# Patient Record
Sex: FEMALE | Race: WHITE
[De-identification: names, ages, dates, MRNs, and addresses within clinical notes are randomized per-mention and may not be internally consistent; named-entity substitution may affect disease eponyms.]

---

## 2017-11-08 ENCOUNTER — CHARTING TRANS (OUTPATIENT)
Dept: OTHER | Age: 82
End: 2017-11-08

## 2018-11-02 VITALS
WEIGHT: 115.74 LBS | RESPIRATION RATE: 16 BRPM | BODY MASS INDEX: 20.51 KG/M2 | OXYGEN SATURATION: 64 % | HEIGHT: 63 IN | HEART RATE: 95 BPM | TEMPERATURE: 97.7 F

## 2019-01-09 ENCOUNTER — WALK IN (OUTPATIENT)
Dept: URGENT CARE | Age: 84
End: 2019-01-09

## 2019-01-09 VITALS — WEIGHT: 110.23 LBS | BODY MASS INDEX: 19.53 KG/M2

## 2019-01-09 DIAGNOSIS — H61.22 IMPACTED CERUMEN OF LEFT EAR: Primary | ICD-10-CM

## 2019-01-09 PROCEDURE — 99213 OFFICE O/P EST LOW 20 MIN: CPT | Performed by: NURSE PRACTITIONER

## 2019-07-03 ENCOUNTER — DIAGNOSTIC TRANS (OUTPATIENT)
Dept: OTHER | Age: 84
End: 2019-07-03

## 2019-07-03 ENCOUNTER — HOSPITAL (OUTPATIENT)
Dept: OTHER | Age: 84
End: 2019-07-03

## 2019-07-03 LAB
ALBUMIN SERPL-MCNC: 3.1 G/DL (ref 3.6–5.1)
ALP SERPL-CCNC: 67 UNITS/L (ref 45–117)
ALT SERPL-CCNC: 20 UNITS/L
ANALYZER ANC (IANC): ABNORMAL
ANION GAP SERPL CALC-SCNC: 10 MMOL/L (ref 10–20)
AST SERPL-CCNC: 21 UNITS/L
BASOPHILS # BLD: 0 K/MCL (ref 0–0.3)
BASOPHILS NFR BLD: 0 %
BILIRUB CONJ SERPL-MCNC: 0.2 MG/DL (ref 0–0.2)
BILIRUB SERPL-MCNC: 0.5 MG/DL (ref 0.2–1)
BUN SERPL-MCNC: 25 MG/DL (ref 6–20)
BUN/CREAT SERPL: 23 (ref 7–25)
CALCIUM SERPL-MCNC: 8.9 MG/DL (ref 8.4–10.2)
CHLORIDE SERPL-SCNC: 105 MMOL/L (ref 98–107)
CO2 SERPL-SCNC: 28 MMOL/L (ref 21–32)
CREAT SERPL-MCNC: 1.11 MG/DL (ref 0.51–0.95)
DIFFERENTIAL METHOD BLD: ABNORMAL
EOSINOPHIL # BLD: 0 K/MCL (ref 0.1–0.5)
EOSINOPHIL NFR BLD: 1 %
ERYTHROCYTE [DISTWIDTH] IN BLOOD: 13.4 % (ref 11–15)
GLUCOSE SERPL-MCNC: 129 MG/DL (ref 65–99)
HCT VFR BLD CALC: 34.6 % (ref 36–46.5)
HGB BLD-MCNC: 11.3 G/DL (ref 12–15.5)
IMM GRANULOCYTES # BLD AUTO: 0 K/MCL (ref 0–0.2)
IMM GRANULOCYTES NFR BLD: 0 %
INR PPP: 2.1
INR PPP: ABNORMAL
LYMPHOCYTES # BLD: 0.5 K/MCL (ref 1–4)
LYMPHOCYTES NFR BLD: 17 %
MAGNESIUM SERPL-MCNC: 2.3 MG/DL (ref 1.7–2.4)
MCH RBC QN AUTO: 34.2 PG (ref 26–34)
MCHC RBC AUTO-ENTMCNC: 32.7 G/DL (ref 32–36.5)
MCV RBC AUTO: 104.8 FL (ref 78–100)
MONOCYTES # BLD: 0.5 K/MCL (ref 0.3–0.9)
MONOCYTES NFR BLD: 18 %
NEUTROPHILS # BLD: 1.8 K/MCL (ref 1.8–7.7)
NEUTROPHILS NFR BLD: 64 %
NEUTS SEG NFR BLD: ABNORMAL %
NRBC (NRBCRE): 0 /100 WBC
PLATELET # BLD: 191 K/MCL (ref 140–450)
POTASSIUM SERPL-SCNC: 4 MMOL/L (ref 3.4–5.1)
PROT SERPL-MCNC: 6.7 G/DL (ref 6.4–8.2)
PROTHROMBIN TIME (PRT2): ABNORMAL
PROTHROMBIN TIME: 20.3 SEC (ref 9.7–11.8)
RBC # BLD: 3.3 MIL/MCL (ref 4–5.2)
SODIUM SERPL-SCNC: 139 MMOL/L (ref 135–145)
TROPONIN I SERPL HS-MCNC: <0.02 NG/ML
WBC # BLD: 2.8 K/MCL (ref 4.2–11)

## 2019-07-04 LAB
AMORPH SED URNS QL MICRO: ABNORMAL
APPEARANCE UR: ABNORMAL
BACTERIA #/AREA URNS HPF: ABNORMAL /HPF
BILIRUB UR QL: NEGATIVE
CAOX CRY URNS QL MICRO: ABNORMAL
COLOR UR: YELLOW
EPITH CASTS #/AREA URNS LPF: ABNORMAL /[LPF]
FATTY CASTS #/AREA URNS LPF: ABNORMAL /[LPF]
GLUCOSE UR-MCNC: NEGATIVE MG/DL
GRAN CASTS #/AREA URNS LPF: ABNORMAL /[LPF]
HGB UR QL: ABNORMAL
HYALINE CASTS #/AREA URNS LPF: ABNORMAL /LPF (ref 0–5)
KETONES UR-MCNC: NEGATIVE MG/DL
LEUKOCYTE ESTERASE UR QL STRIP: ABNORMAL
LEUKOCYTE ESTERASE UR QL STRIP: ABNORMAL
MIXED CELL CASTS #/AREA URNS LPF: ABNORMAL /[LPF]
MUCOUS THREADS URNS QL MICRO: PRESENT
NITRITE UR QL: NEGATIVE
PH UR: 6 UNITS (ref 5–7)
PROT UR QL: NEGATIVE MG/DL
RBC #/AREA URNS HPF: ABNORMAL /HPF (ref 0–2)
RBC CASTS #/AREA URNS LPF: ABNORMAL /[LPF]
RENAL EPI CELLS #/AREA URNS HPF: ABNORMAL /[HPF]
SP GR UR: 1.01 (ref 1–1.03)
SPECIMEN SOURCE: ABNORMAL
SPERM URNS QL MICRO: ABNORMAL
SQUAMOUS #/AREA URNS HPF: ABNORMAL /HPF (ref 0–5)
T VAGINALIS URNS QL MICRO: ABNORMAL
TRANS CELLS #/AREA URNS HPF: ABNORMAL /HPF
TRI-PHOS CRY URNS QL MICRO: ABNORMAL
URATE CRY URNS QL MICRO: ABNORMAL
URINE REFLEX: ABNORMAL
URNS CMNT MICRO: ABNORMAL
UROBILINOGEN UR QL: 0.2 MG/DL (ref 0–1)
WAXY CASTS #/AREA URNS LPF: ABNORMAL /[LPF]
WBC #/AREA URNS HPF: ABNORMAL /HPF (ref 0–5)
WBC #/AREA URNS HPF: ABNORMAL /[HPF]
WBC CASTS #/AREA URNS LPF: ABNORMAL /[LPF]
YEAST HYPHAE URNS QL MICRO: ABNORMAL
YEAST URNS QL MICRO: ABNORMAL

## 2019-07-05 LAB
BACTERIA UR CULT: NORMAL

## 2020-01-01 ENCOUNTER — TELEPHONE (OUTPATIENT)
Dept: HEMATOLOGY/ONCOLOGY | Facility: HOSPITAL | Age: 85
End: 2020-01-01

## 2020-01-01 ENCOUNTER — APPOINTMENT (OUTPATIENT)
Dept: CT IMAGING | Facility: HOSPITAL | Age: 85
End: 2020-01-01
Attending: INTERNAL MEDICINE
Payer: MEDICARE

## 2020-01-01 ENCOUNTER — APPOINTMENT (OUTPATIENT)
Dept: HEMATOLOGY/ONCOLOGY | Facility: HOSPITAL | Age: 85
End: 2020-01-01
Attending: INTERNAL MEDICINE
Payer: MEDICARE

## 2020-01-01 ENCOUNTER — OFFICE VISIT (OUTPATIENT)
Dept: ORTHOPEDICS CLINIC | Facility: CLINIC | Age: 85
End: 2020-01-01
Payer: MEDICARE

## 2020-01-01 ENCOUNTER — APPOINTMENT (OUTPATIENT)
Dept: ULTRASOUND IMAGING | Facility: HOSPITAL | Age: 85
End: 2020-01-01
Attending: INTERNAL MEDICINE
Payer: MEDICARE

## 2020-01-01 ENCOUNTER — APPOINTMENT (OUTPATIENT)
Dept: GENERAL RADIOLOGY | Facility: HOSPITAL | Age: 85
DRG: 808 | End: 2020-01-01
Attending: EMERGENCY MEDICINE
Payer: MEDICARE

## 2020-01-01 ENCOUNTER — TELEPHONE (OUTPATIENT)
Dept: ORTHOPEDICS CLINIC | Facility: CLINIC | Age: 85
End: 2020-01-01

## 2020-01-01 ENCOUNTER — OFFICE VISIT (OUTPATIENT)
Dept: HEMATOLOGY/ONCOLOGY | Facility: HOSPITAL | Age: 85
End: 2020-01-01
Attending: CLINICAL NURSE SPECIALIST
Payer: MEDICARE

## 2020-01-01 ENCOUNTER — LAB REQUISITION (OUTPATIENT)
Dept: LAB | Facility: HOSPITAL | Age: 85
End: 2020-01-01
Payer: MEDICARE

## 2020-01-01 ENCOUNTER — HOSPITAL ENCOUNTER (OUTPATIENT)
Facility: HOSPITAL | Age: 85
Setting detail: OBSERVATION
Discharge: HOME | End: 2020-01-01
Attending: EMERGENCY MEDICINE | Admitting: STUDENT IN AN ORGANIZED HEALTH CARE EDUCATION/TRAINING PROGRAM
Payer: MEDICARE

## 2020-01-01 ENCOUNTER — APPOINTMENT (OUTPATIENT)
Dept: GENERAL RADIOLOGY | Facility: HOSPITAL | Age: 85
End: 2020-01-01
Payer: MEDICARE

## 2020-01-01 ENCOUNTER — APPOINTMENT (OUTPATIENT)
Dept: ULTRASOUND IMAGING | Facility: HOSPITAL | Age: 85
DRG: 835 | End: 2020-01-01
Attending: STUDENT IN AN ORGANIZED HEALTH CARE EDUCATION/TRAINING PROGRAM
Payer: MEDICARE

## 2020-01-01 ENCOUNTER — HOSPITAL ENCOUNTER (INPATIENT)
Facility: HOSPITAL | Age: 85
LOS: 3 days | Discharge: HOME | DRG: 808 | End: 2020-01-01
Attending: EMERGENCY MEDICINE | Admitting: STUDENT IN AN ORGANIZED HEALTH CARE EDUCATION/TRAINING PROGRAM
Payer: MEDICARE

## 2020-01-01 ENCOUNTER — HOSPITAL ENCOUNTER (INPATIENT)
Facility: HOSPITAL | Age: 85
LOS: 4 days | Discharge: HOME | DRG: 835 | End: 2020-01-01
Attending: EMERGENCY MEDICINE | Admitting: STUDENT IN AN ORGANIZED HEALTH CARE EDUCATION/TRAINING PROGRAM
Payer: MEDICARE

## 2020-01-01 ENCOUNTER — LAB REQUISITION (OUTPATIENT)
Dept: LAB | Facility: HOSPITAL | Age: 85
End: 2020-01-01
Payer: COMMERCIAL

## 2020-01-01 ENCOUNTER — APPOINTMENT (OUTPATIENT)
Dept: GENERAL RADIOLOGY | Facility: HOSPITAL | Age: 85
DRG: 835 | End: 2020-01-01
Attending: EMERGENCY MEDICINE
Payer: MEDICARE

## 2020-01-01 VITALS
SYSTOLIC BLOOD PRESSURE: 90 MMHG | DIASTOLIC BLOOD PRESSURE: 49 MMHG | OXYGEN SATURATION: 99 % | TEMPERATURE: 98 F | RESPIRATION RATE: 16 BRPM | HEART RATE: 64 BPM

## 2020-01-01 VITALS
TEMPERATURE: 99 F | HEART RATE: 93 BPM | SYSTOLIC BLOOD PRESSURE: 112 MMHG | OXYGEN SATURATION: 95 % | DIASTOLIC BLOOD PRESSURE: 67 MMHG | WEIGHT: 99.63 LBS | RESPIRATION RATE: 18 BRPM | BODY MASS INDEX: 20 KG/M2

## 2020-01-01 VITALS
TEMPERATURE: 97 F | RESPIRATION RATE: 18 BRPM | SYSTOLIC BLOOD PRESSURE: 118 MMHG | OXYGEN SATURATION: 99 % | DIASTOLIC BLOOD PRESSURE: 66 MMHG | WEIGHT: 97 LBS | HEART RATE: 103 BPM | BODY MASS INDEX: 20 KG/M2

## 2020-01-01 VITALS
SYSTOLIC BLOOD PRESSURE: 99 MMHG | DIASTOLIC BLOOD PRESSURE: 57 MMHG | BODY MASS INDEX: 19 KG/M2 | TEMPERATURE: 98 F | RESPIRATION RATE: 18 BRPM | WEIGHT: 93 LBS | OXYGEN SATURATION: 99 % | HEART RATE: 65 BPM

## 2020-01-01 VITALS
DIASTOLIC BLOOD PRESSURE: 71 MMHG | HEART RATE: 105 BPM | OXYGEN SATURATION: 97 % | SYSTOLIC BLOOD PRESSURE: 113 MMHG | WEIGHT: 92.13 LBS | TEMPERATURE: 98 F | RESPIRATION RATE: 18 BRPM | BODY MASS INDEX: 18.57 KG/M2 | HEIGHT: 59.06 IN

## 2020-01-01 VITALS
RESPIRATION RATE: 18 BRPM | TEMPERATURE: 98 F | HEART RATE: 79 BPM | SYSTOLIC BLOOD PRESSURE: 104 MMHG | DIASTOLIC BLOOD PRESSURE: 57 MMHG | OXYGEN SATURATION: 96 %

## 2020-01-01 VITALS
HEART RATE: 96 BPM | OXYGEN SATURATION: 100 % | RESPIRATION RATE: 18 BRPM | TEMPERATURE: 98 F | DIASTOLIC BLOOD PRESSURE: 74 MMHG | SYSTOLIC BLOOD PRESSURE: 110 MMHG

## 2020-01-01 VITALS
SYSTOLIC BLOOD PRESSURE: 102 MMHG | RESPIRATION RATE: 18 BRPM | OXYGEN SATURATION: 92 % | BODY MASS INDEX: 18.57 KG/M2 | WEIGHT: 98.38 LBS | DIASTOLIC BLOOD PRESSURE: 61 MMHG | HEIGHT: 61 IN | TEMPERATURE: 98 F | HEART RATE: 87 BPM

## 2020-01-01 VITALS
HEART RATE: 93 BPM | SYSTOLIC BLOOD PRESSURE: 111 MMHG | RESPIRATION RATE: 18 BRPM | TEMPERATURE: 98 F | DIASTOLIC BLOOD PRESSURE: 60 MMHG

## 2020-01-01 VITALS
RESPIRATION RATE: 20 BRPM | DIASTOLIC BLOOD PRESSURE: 69 MMHG | SYSTOLIC BLOOD PRESSURE: 109 MMHG | TEMPERATURE: 98 F | WEIGHT: 97 LBS | OXYGEN SATURATION: 97 % | HEART RATE: 67 BPM | BODY MASS INDEX: 20 KG/M2

## 2020-01-01 VITALS
RESPIRATION RATE: 18 BRPM | SYSTOLIC BLOOD PRESSURE: 104 MMHG | OXYGEN SATURATION: 96 % | TEMPERATURE: 98 F | WEIGHT: 92.69 LBS | HEIGHT: 63 IN | DIASTOLIC BLOOD PRESSURE: 55 MMHG | BODY MASS INDEX: 16.42 KG/M2 | HEART RATE: 83 BPM

## 2020-01-01 DIAGNOSIS — Z51.5 PALLIATIVE CARE ENCOUNTER: ICD-10-CM

## 2020-01-01 DIAGNOSIS — D70.8 OTHER NEUTROPENIA (HCC): ICD-10-CM

## 2020-01-01 DIAGNOSIS — D64.9 SYMPTOMATIC ANEMIA: ICD-10-CM

## 2020-01-01 DIAGNOSIS — D46.Z MYELODYSPLASTIC SYNDROME, HIGH GRADE (HCC): Primary | ICD-10-CM

## 2020-01-01 DIAGNOSIS — Z71.89 GOALS OF CARE, COUNSELING/DISCUSSION: ICD-10-CM

## 2020-01-01 DIAGNOSIS — L03.011 CELLULITIS OF FINGER OF RIGHT HAND: Primary | ICD-10-CM

## 2020-01-01 DIAGNOSIS — D64.9 SYMPTOMATIC ANEMIA: Primary | ICD-10-CM

## 2020-01-01 DIAGNOSIS — L03.011 CELLULITIS OF FINGER OF RIGHT HAND: ICD-10-CM

## 2020-01-01 DIAGNOSIS — D63.0 ANEMIA IN NEOPLASTIC DISEASE: ICD-10-CM

## 2020-01-01 DIAGNOSIS — D53.9 MACROCYTIC ANEMIA: Primary | ICD-10-CM

## 2020-01-01 DIAGNOSIS — D46.Z MYELODYSPLASTIC SYNDROME, HIGH GRADE (HCC): ICD-10-CM

## 2020-01-01 DIAGNOSIS — R58 BLEEDING: ICD-10-CM

## 2020-01-01 DIAGNOSIS — R06.02 SHORTNESS OF BREATH: ICD-10-CM

## 2020-01-01 DIAGNOSIS — D69.6 THROMBOCYTOPENIA (HCC): Primary | ICD-10-CM

## 2020-01-01 DIAGNOSIS — D69.6 THROMBOCYTOPENIA (HCC): ICD-10-CM

## 2020-01-01 DIAGNOSIS — L03.011 CELLULITIS OF RIGHT MIDDLE FINGER: ICD-10-CM

## 2020-01-01 DIAGNOSIS — R60.0 BILATERAL LOWER EXTREMITY EDEMA: ICD-10-CM

## 2020-01-01 DIAGNOSIS — Z20.828 CONTACT WITH AND (SUSPECTED) EXPOSURE TO OTHER VIRAL COMMUNICABLE DISEASES: ICD-10-CM

## 2020-01-01 DIAGNOSIS — L03.011 CELLULITIS OF RIGHT MIDDLE FINGER: Primary | ICD-10-CM

## 2020-01-01 LAB
ALBUMIN SERPL-MCNC: 2.9 G/DL (ref 3.4–5)
ALBUMIN SERPL-MCNC: 3.3 G/DL (ref 3.4–5)
ALBUMIN SERPL-MCNC: 3.4 G/DL (ref 3.4–5)
ALBUMIN/GLOB SERPL: 0.9 {RATIO} (ref 1–2)
ALBUMIN/GLOB SERPL: 1 {RATIO} (ref 1–2)
ALBUMIN/GLOB SERPL: 1 {RATIO} (ref 1–2)
ALP LIVER SERPL-CCNC: 76 U/L (ref 55–142)
ALP LIVER SERPL-CCNC: 93 U/L (ref 55–142)
ALP LIVER SERPL-CCNC: 96 U/L (ref 55–142)
ALT SERPL-CCNC: 18 U/L (ref 13–56)
ALT SERPL-CCNC: 21 U/L (ref 13–56)
ALT SERPL-CCNC: 23 U/L (ref 13–56)
ANION GAP SERPL CALC-SCNC: 5 MMOL/L (ref 0–18)
ANION GAP SERPL CALC-SCNC: 6 MMOL/L (ref 0–18)
ANION GAP SERPL CALC-SCNC: 7 MMOL/L (ref 0–18)
ANTIBODY SCREEN: NEGATIVE
AST SERPL-CCNC: 13 U/L (ref 15–37)
AST SERPL-CCNC: 13 U/L (ref 15–37)
AST SERPL-CCNC: 18 U/L (ref 15–37)
ATRIAL RATE: 76 BPM
BASOPHILS # BLD AUTO: 0.02 X10(3) UL (ref 0–0.2)
BASOPHILS # BLD: 0 X10(3) UL (ref 0–0.2)
BASOPHILS NFR BLD AUTO: 0.8 %
BASOPHILS NFR BLD: 0 %
BILIRUB SERPL-MCNC: 0.6 MG/DL (ref 0.1–2)
BILIRUB SERPL-MCNC: 0.6 MG/DL (ref 0.1–2)
BILIRUB SERPL-MCNC: 0.8 MG/DL (ref 0.1–2)
BLASTS # BLD: 0.04 X10(3) UL
BLASTS NFR BLD: 2 %
BLOOD TYPE BARCODE: 7300
BUN BLD-MCNC: 32 MG/DL (ref 7–18)
BUN BLD-MCNC: 51 MG/DL (ref 7–18)
BUN BLD-MCNC: 59 MG/DL (ref 7–18)
BUN/CREAT SERPL: 36.8 (ref 10–20)
BUN/CREAT SERPL: 37.2 (ref 10–20)
BUN/CREAT SERPL: 49.2 (ref 10–20)
CALCIUM BLD-MCNC: 8.9 MG/DL (ref 8.5–10.1)
CALCIUM BLD-MCNC: 9.3 MG/DL (ref 8.5–10.1)
CALCIUM BLD-MCNC: 9.4 MG/DL (ref 8.5–10.1)
CHLORIDE SERPL-SCNC: 104 MMOL/L (ref 98–112)
CHLORIDE SERPL-SCNC: 105 MMOL/L (ref 98–112)
CHLORIDE SERPL-SCNC: 106 MMOL/L (ref 98–112)
CO2 SERPL-SCNC: 26 MMOL/L (ref 21–32)
CO2 SERPL-SCNC: 27 MMOL/L (ref 21–32)
CO2 SERPL-SCNC: 30 MMOL/L (ref 21–32)
CREAT BLD-MCNC: 0.87 MG/DL (ref 0.55–1.02)
CREAT BLD-MCNC: 1.2 MG/DL (ref 0.55–1.02)
CREAT BLD-MCNC: 1.37 MG/DL (ref 0.55–1.02)
DEPRECATED HBV CORE AB SER IA-ACNC: 374.6 NG/ML (ref 18–340)
DEPRECATED RDW RBC AUTO: 65.7 FL (ref 35.1–46.3)
DEPRECATED RDW RBC AUTO: 81 FL (ref 35.1–46.3)
DEPRECATED RDW RBC AUTO: 81.2 FL (ref 35.1–46.3)
DEPRECATED RDW RBC AUTO: 84.2 FL (ref 35.1–46.3)
EOSINOPHIL # BLD AUTO: 0.02 X10(3) UL (ref 0–0.7)
EOSINOPHIL # BLD AUTO: 0.06 X10(3) UL (ref 0–0.7)
EOSINOPHIL # BLD AUTO: 0.07 X10(3) UL (ref 0–0.7)
EOSINOPHIL # BLD: 0 X10(3) UL (ref 0–0.7)
EOSINOPHIL NFR BLD AUTO: 0.8 %
EOSINOPHIL NFR BLD AUTO: 2.4 %
EOSINOPHIL NFR BLD AUTO: 2.7 %
EOSINOPHIL NFR BLD: 0 %
ERYTHROCYTE [DISTWIDTH] IN BLOOD BY AUTOMATED COUNT: 16.2 % (ref 11–15)
ERYTHROCYTE [DISTWIDTH] IN BLOOD BY AUTOMATED COUNT: 21.6 % (ref 11–15)
ERYTHROCYTE [DISTWIDTH] IN BLOOD BY AUTOMATED COUNT: 22.7 % (ref 11–15)
ERYTHROCYTE [DISTWIDTH] IN BLOOD BY AUTOMATED COUNT: 23.4 % (ref 11–15)
FOLATE SERPL-MCNC: 14.3 NG/ML (ref 8.7–?)
GLOBULIN PLAS-MCNC: 3.1 G/DL (ref 2.8–4.4)
GLOBULIN PLAS-MCNC: 3.4 G/DL (ref 2.8–4.4)
GLOBULIN PLAS-MCNC: 3.5 G/DL (ref 2.8–4.4)
GLUCOSE BLD-MCNC: 143 MG/DL (ref 70–99)
GLUCOSE BLD-MCNC: 88 MG/DL (ref 70–99)
GLUCOSE BLD-MCNC: 97 MG/DL (ref 70–99)
HAV IGM SER QL: 2.1 MG/DL (ref 1.6–2.6)
HAV IGM SER QL: 2.1 MG/DL (ref 1.6–2.6)
HAV IGM SER QL: 2.3 MG/DL (ref 1.6–2.6)
HCT VFR BLD AUTO: 20.2 % (ref 35–48)
HCT VFR BLD AUTO: 25.6 % (ref 35–48)
HCT VFR BLD AUTO: 26.2 % (ref 35–48)
HCT VFR BLD AUTO: 27.5 % (ref 35–48)
HGB BLD-MCNC: 6.5 G/DL (ref 12–16)
HGB BLD-MCNC: 8.1 G/DL (ref 12–16)
HGB BLD-MCNC: 8.3 G/DL (ref 12–16)
HGB BLD-MCNC: 8.6 G/DL (ref 12–16)
HGB BLD-MCNC: 9.1 G/DL (ref 12–16)
HGB RETIC QN AUTO: 42.6 PG (ref 28.2–36.6)
IMM GRANULOCYTES # BLD AUTO: 0.02 X10(3) UL (ref 0–1)
IMM GRANULOCYTES # BLD AUTO: 0.03 X10(3) UL (ref 0–1)
IMM GRANULOCYTES # BLD AUTO: 0.03 X10(3) UL (ref 0–1)
IMM GRANULOCYTES NFR BLD: 0.8 %
IMM GRANULOCYTES NFR BLD: 1.2 %
IMM GRANULOCYTES NFR BLD: 1.2 %
IMM RETICS NFR: 0.18 RATIO (ref 0.1–0.3)
INR BLD: 1.87 (ref 0.89–1.11)
INR BLD: 2.31 (ref 0.89–1.11)
INR BLD: 2.67 (ref 0.86–1.11)
INR BLD: 3.16 (ref 0.86–1.11)
IRON SATURATION: 38 % (ref 15–50)
IRON SERPL-MCNC: 146 UG/DL (ref 50–170)
LDH SERPL L TO P-CCNC: 329 U/L
LYMPHOCYTES # BLD AUTO: 0.49 X10(3) UL (ref 1–4)
LYMPHOCYTES # BLD AUTO: 0.54 X10(3) UL (ref 1–4)
LYMPHOCYTES # BLD AUTO: 0.59 X10(3) UL (ref 1–4)
LYMPHOCYTES NFR BLD AUTO: 19.2 %
LYMPHOCYTES NFR BLD AUTO: 22.8 %
LYMPHOCYTES NFR BLD AUTO: 22.9 %
LYMPHOCYTES NFR BLD: 0.42 X10(3) UL (ref 1–4)
LYMPHOCYTES NFR BLD: 21 %
M PROTEIN MFR SERPL ELPH: 6 G/DL (ref 6.4–8.2)
M PROTEIN MFR SERPL ELPH: 6.7 G/DL (ref 6.4–8.2)
M PROTEIN MFR SERPL ELPH: 6.9 G/DL (ref 6.4–8.2)
MCH RBC QN AUTO: 32.7 PG (ref 26–34)
MCH RBC QN AUTO: 33.9 PG (ref 26–34)
MCH RBC QN AUTO: 34 PG (ref 26–34)
MCH RBC QN AUTO: 36.5 PG (ref 26–34)
MCHC RBC AUTO-ENTMCNC: 32.2 G/DL (ref 31–37)
MCHC RBC AUTO-ENTMCNC: 32.4 G/DL (ref 31–37)
MCHC RBC AUTO-ENTMCNC: 32.8 G/DL (ref 31–37)
MCHC RBC AUTO-ENTMCNC: 33.1 G/DL (ref 31–37)
MCV RBC AUTO: 102.6 FL (ref 80–100)
MCV RBC AUTO: 104.5 FL (ref 80–100)
MCV RBC AUTO: 113.5 FL (ref 80–100)
MCV RBC AUTO: 99.6 FL (ref 80–100)
MONOCYTES # BLD AUTO: 0.49 X10(3) UL (ref 0.1–1)
MONOCYTES # BLD AUTO: 0.65 X10(3) UL (ref 0.1–1)
MONOCYTES # BLD AUTO: 0.74 X10(3) UL (ref 0.1–1)
MONOCYTES # BLD: 0.3 X10(3) UL (ref 0.1–1)
MONOCYTES NFR BLD AUTO: 19.2 %
MONOCYTES NFR BLD AUTO: 27.4 %
MONOCYTES NFR BLD AUTO: 28.7 %
MONOCYTES NFR BLD: 15 %
NEUTROPHILS # BLD AUTO: 1.12 X10 (3) UL (ref 1.5–7.7)
NEUTROPHILS # BLD AUTO: 1.12 X10(3) UL (ref 1.5–7.7)
NEUTROPHILS # BLD AUTO: 1.13 X10 (3) UL (ref 1.5–7.7)
NEUTROPHILS # BLD AUTO: 1.13 X10(3) UL (ref 1.5–7.7)
NEUTROPHILS # BLD AUTO: 1.29 X10 (3) UL (ref 1.5–7.7)
NEUTROPHILS # BLD AUTO: 1.46 X10 (3) UL (ref 1.5–7.7)
NEUTROPHILS # BLD AUTO: 1.46 X10(3) UL (ref 1.5–7.7)
NEUTROPHILS NFR BLD AUTO: 43.7 %
NEUTROPHILS NFR BLD AUTO: 47.4 %
NEUTROPHILS NFR BLD AUTO: 57.2 %
NEUTROPHILS NFR BLD: 58 %
NEUTS BAND NFR BLD: 4 %
NEUTS HYPERSEG # BLD: 1.24 X10(3) UL (ref 1.5–7.7)
NT-PROBNP SERPL-MCNC: 6493 PG/ML (ref ?–450)
OSMOLALITY SERPL CALC.SUM OF ELEC: 292 MOSM/KG (ref 275–295)
OSMOLALITY SERPL CALC.SUM OF ELEC: 302 MOSM/KG (ref 275–295)
OSMOLALITY SERPL CALC.SUM OF ELEC: 306 MOSM/KG (ref 275–295)
P AXIS: 51 DEGREES
P-R INTERVAL: 336 MS
PHOSPHATE SERPL-MCNC: 3.1 MG/DL (ref 2.5–4.9)
PLATELET # BLD AUTO: 115 10(3)UL (ref 150–450)
PLATELET # BLD AUTO: 117 10(3)UL (ref 150–450)
PLATELET # BLD AUTO: 126 10(3)UL (ref 150–450)
PLATELET # BLD AUTO: 128 10(3)UL (ref 150–450)
PLATELET MORPHOLOGY: NORMAL
PLATELET MORPHOLOGY: NORMAL
POTASSIUM SERPL-SCNC: 3.5 MMOL/L (ref 3.5–5.1)
POTASSIUM SERPL-SCNC: 3.5 MMOL/L (ref 3.5–5.1)
POTASSIUM SERPL-SCNC: 4.5 MMOL/L (ref 3.5–5.1)
POTASSIUM SERPL-SCNC: 4.6 MMOL/L (ref 3.5–5.1)
PSA SERPL DL<=0.01 NG/ML-MCNC: 22 SECONDS (ref 12.4–14.6)
PSA SERPL DL<=0.01 NG/ML-MCNC: 26 SECONDS (ref 12.4–14.6)
PSA SERPL DL<=0.01 NG/ML-MCNC: 28.9 SECONDS (ref 12.1–14.7)
PSA SERPL DL<=0.01 NG/ML-MCNC: 32.9 SECONDS (ref 12.1–14.7)
Q-T INTERVAL: 408 MS
QRS DURATION: 104 MS
QTC CALCULATION (BEZET): 459 MS
R AXIS: 94 DEGREES
RBC # BLD AUTO: 1.78 X10(6)UL (ref 3.8–5.3)
RBC # BLD AUTO: 2.45 X10(6)UL (ref 3.8–5.3)
RBC # BLD AUTO: 2.63 X10(6)UL (ref 3.8–5.3)
RBC # BLD AUTO: 2.68 X10(6)UL (ref 3.8–5.3)
RETICS # AUTO: 30.6 X10(3) UL (ref 22.5–147.5)
RETICS/RBC NFR AUTO: 1.2 % (ref 0.5–2.5)
RH BLOOD TYPE: POSITIVE
SARS-COV-2 RNA RESP QL NAA+PROBE: NOT DETECTED
SARS-COV-2 RNA RESP QL NAA+PROBE: NOT DETECTED
SODIUM SERPL-SCNC: 138 MMOL/L (ref 136–145)
SODIUM SERPL-SCNC: 138 MMOL/L (ref 136–145)
SODIUM SERPL-SCNC: 140 MMOL/L (ref 136–145)
T AXIS: -83 DEGREES
TOTAL CELLS COUNTED: 100
TOTAL IRON BINDING CAPACITY: 380 UG/DL (ref 240–450)
TRANSFERRIN SERPL-MCNC: 255 MG/DL (ref 200–360)
VENTRICULAR RATE: 76 BPM
VIT B12 SERPL-MCNC: 694 PG/ML (ref 193–986)
WBC # BLD AUTO: 2 X10(3) UL (ref 4–11)
WBC # BLD AUTO: 2.4 X10(3) UL (ref 4–11)
WBC # BLD AUTO: 2.6 X10(3) UL (ref 4–11)
WBC # BLD AUTO: 2.6 X10(3) UL (ref 4–11)

## 2020-01-01 PROCEDURE — 96374 THER/PROPH/DIAG INJ IV PUSH: CPT

## 2020-01-01 PROCEDURE — 99231 SBSQ HOSP IP/OBS SF/LOW 25: CPT | Performed by: INTERNAL MEDICINE

## 2020-01-01 PROCEDURE — 85007 BL SMEAR W/DIFF WBC COUNT: CPT

## 2020-01-01 PROCEDURE — 85007 BL SMEAR W/DIFF WBC COUNT: CPT | Performed by: INTERNAL MEDICINE

## 2020-01-01 PROCEDURE — 86850 RBC ANTIBODY SCREEN: CPT

## 2020-01-01 PROCEDURE — 10060 I&D ABSCESS SIMPLE/SINGLE: CPT | Performed by: ORTHOPAEDIC SURGERY

## 2020-01-01 PROCEDURE — 36430 TRANSFUSION BLD/BLD COMPNT: CPT

## 2020-01-01 PROCEDURE — 99232 SBSQ HOSP IP/OBS MODERATE 35: CPT | Performed by: INTERNAL MEDICINE

## 2020-01-01 PROCEDURE — 99213 OFFICE O/P EST LOW 20 MIN: CPT | Performed by: CLINICAL NURSE SPECIALIST

## 2020-01-01 PROCEDURE — 85027 COMPLETE CBC AUTOMATED: CPT

## 2020-01-01 PROCEDURE — 86920 COMPATIBILITY TEST SPIN: CPT

## 2020-01-01 PROCEDURE — 0H9GXZZ DRAINAGE OF LEFT HAND SKIN, EXTERNAL APPROACH: ICD-10-PCS | Performed by: ORTHOPAEDIC SURGERY

## 2020-01-01 PROCEDURE — 86900 BLOOD TYPING SEROLOGIC ABO: CPT

## 2020-01-01 PROCEDURE — 99356 PROLONGED SERV,INPATIENT,1ST HR: CPT | Performed by: CLINICAL NURSE SPECIALIST

## 2020-01-01 PROCEDURE — 99222 1ST HOSP IP/OBS MODERATE 55: CPT | Performed by: CLINICAL NURSE SPECIALIST

## 2020-01-01 PROCEDURE — 85027 COMPLETE CBC AUTOMATED: CPT | Performed by: INTERNAL MEDICINE

## 2020-01-01 PROCEDURE — 99024 POSTOP FOLLOW-UP VISIT: CPT | Performed by: ORTHOPAEDIC SURGERY

## 2020-01-01 PROCEDURE — 86901 BLOOD TYPING SEROLOGIC RH(D): CPT

## 2020-01-01 PROCEDURE — 99215 OFFICE O/P EST HI 40 MIN: CPT | Performed by: INTERNAL MEDICINE

## 2020-01-01 PROCEDURE — 85025 COMPLETE CBC W/AUTO DIFF WBC: CPT

## 2020-01-01 PROCEDURE — 93970 EXTREMITY STUDY: CPT | Performed by: INTERNAL MEDICINE

## 2020-01-01 PROCEDURE — 99221 1ST HOSP IP/OBS SF/LOW 40: CPT | Performed by: ORTHOPAEDIC SURGERY

## 2020-01-01 PROCEDURE — 30233N1 TRANSFUSION OF NONAUTOLOGOUS RED BLOOD CELLS INTO PERIPHERAL VEIN, PERCUTANEOUS APPROACH: ICD-10-PCS | Performed by: INTERNAL MEDICINE

## 2020-01-01 PROCEDURE — 1111F DSCHRG MED/CURRENT MED MERGE: CPT | Performed by: ORTHOPAEDIC SURGERY

## 2020-01-01 PROCEDURE — 99214 OFFICE O/P EST MOD 30 MIN: CPT | Performed by: INTERNAL MEDICINE

## 2020-01-01 PROCEDURE — 99233 SBSQ HOSP IP/OBS HIGH 50: CPT | Performed by: CLINICAL NURSE SPECIALIST

## 2020-01-01 PROCEDURE — 30233N1 TRANSFUSION OF NONAUTOLOGOUS RED BLOOD CELLS INTO PERIPHERAL VEIN, PERCUTANEOUS APPROACH: ICD-10-PCS | Performed by: STUDENT IN AN ORGANIZED HEALTH CARE EDUCATION/TRAINING PROGRAM

## 2020-01-01 PROCEDURE — 99223 1ST HOSP IP/OBS HIGH 75: CPT | Performed by: INTERNAL MEDICINE

## 2020-01-01 PROCEDURE — 36415 COLL VENOUS BLD VENIPUNCTURE: CPT

## 2020-01-01 PROCEDURE — 30233N1 TRANSFUSION OF NONAUTOLOGOUS RED BLOOD CELLS INTO PERIPHERAL VEIN, PERCUTANEOUS APPROACH: ICD-10-PCS | Performed by: CLINICAL NURSE SPECIALIST

## 2020-01-01 PROCEDURE — 99212 OFFICE O/P EST SF 10 MIN: CPT | Performed by: NURSE PRACTITIONER

## 2020-01-01 PROCEDURE — 71045 X-RAY EXAM CHEST 1 VIEW: CPT | Performed by: EMERGENCY MEDICINE

## 2020-01-01 PROCEDURE — 71045 X-RAY EXAM CHEST 1 VIEW: CPT

## 2020-01-01 PROCEDURE — 99213 OFFICE O/P EST LOW 20 MIN: CPT | Performed by: INTERNAL MEDICINE

## 2020-01-01 PROCEDURE — 71275 CT ANGIOGRAPHY CHEST: CPT | Performed by: INTERNAL MEDICINE

## 2020-01-01 PROCEDURE — 76882 US LMTD JT/FCL EVL NVASC XTR: CPT | Performed by: STUDENT IN AN ORGANIZED HEALTH CARE EDUCATION/TRAINING PROGRAM

## 2020-01-01 PROCEDURE — 73140 X-RAY EXAM OF FINGER(S): CPT | Performed by: EMERGENCY MEDICINE

## 2020-01-01 PROCEDURE — 30233R1 TRANSFUSION OF NONAUTOLOGOUS PLATELETS INTO PERIPHERAL VEIN, PERCUTANEOUS APPROACH: ICD-10-PCS | Performed by: STUDENT IN AN ORGANIZED HEALTH CARE EDUCATION/TRAINING PROGRAM

## 2020-01-01 RX ORDER — ALBUTEROL SULFATE 90 UG/1
2 AEROSOL, METERED RESPIRATORY (INHALATION) EVERY 6 HOURS PRN
COMMUNITY

## 2020-01-01 RX ORDER — ACETAMINOPHEN 325 MG/1
650 TABLET ORAL ONCE
Status: CANCELLED | OUTPATIENT
Start: 2020-01-01

## 2020-01-01 RX ORDER — SULFAMETHOXAZOLE AND TRIMETHOPRIM 800; 160 MG/1; MG/1
1 TABLET ORAL DAILY
Qty: 10 TABLET | Refills: 0 | Status: SHIPPED | OUTPATIENT
Start: 2020-01-01 | End: 2020-01-01

## 2020-01-01 RX ORDER — IPRATROPIUM BROMIDE AND ALBUTEROL SULFATE 2.5; .5 MG/3ML; MG/3ML
3 SOLUTION RESPIRATORY (INHALATION) 3 TIMES DAILY
Status: DISCONTINUED | OUTPATIENT
Start: 2020-01-01 | End: 2020-01-01

## 2020-01-01 RX ORDER — FUROSEMIDE 10 MG/ML
20 INJECTION INTRAMUSCULAR; INTRAVENOUS ONCE
Status: DISCONTINUED | OUTPATIENT
Start: 2020-01-01 | End: 2020-01-01

## 2020-01-01 RX ORDER — SODIUM CHLORIDE 9 MG/ML
INJECTION, SOLUTION INTRAVENOUS ONCE
Status: COMPLETED | OUTPATIENT
Start: 2020-01-01 | End: 2020-01-01

## 2020-01-01 RX ORDER — DIGOXIN 125 MCG
125 TABLET ORAL DAILY
Qty: 90 TABLET | Refills: 0 | Status: SHIPPED | OUTPATIENT
Start: 2020-01-01

## 2020-01-01 RX ORDER — ACETAMINOPHEN 325 MG/1
650 TABLET ORAL ONCE
Status: COMPLETED | OUTPATIENT
Start: 2020-01-01 | End: 2020-01-01

## 2020-01-01 RX ORDER — ACETAMINOPHEN 325 MG/1
650 TABLET ORAL EVERY 6 HOURS PRN
Status: DISCONTINUED | OUTPATIENT
Start: 2020-01-01 | End: 2020-01-01

## 2020-01-01 RX ORDER — FUROSEMIDE 10 MG/ML
20 INJECTION INTRAMUSCULAR; INTRAVENOUS
Status: DISCONTINUED | OUTPATIENT
Start: 2020-01-01 | End: 2020-01-01

## 2020-01-01 RX ORDER — DIPHENHYDRAMINE HCL 25 MG
25 CAPSULE ORAL ONCE
Status: DISCONTINUED | OUTPATIENT
Start: 2020-01-01 | End: 2020-01-01

## 2020-01-01 RX ORDER — HYDROCODONE BITARTRATE AND ACETAMINOPHEN 5; 325 MG/1; MG/1
1 TABLET ORAL EVERY 6 HOURS PRN
Status: DISCONTINUED | OUTPATIENT
Start: 2020-01-01 | End: 2020-01-01

## 2020-01-01 RX ORDER — 3% SODIUM CHLORIDE 3 G/100ML
INJECTION, SOLUTION INTRAVENOUS CONTINUOUS
Status: DISCONTINUED | OUTPATIENT
Start: 2020-01-01 | End: 2020-01-01 | Stop reason: ALTCHOICE

## 2020-01-01 RX ORDER — DOCUSATE SODIUM 100 MG/1
100 CAPSULE, LIQUID FILLED ORAL 2 TIMES DAILY
Status: DISCONTINUED | OUTPATIENT
Start: 2020-01-01 | End: 2020-01-01

## 2020-01-01 RX ORDER — FUROSEMIDE 10 MG/ML
20 INJECTION INTRAMUSCULAR; INTRAVENOUS ONCE
Status: CANCELLED | OUTPATIENT
Start: 2020-01-01

## 2020-01-01 RX ORDER — FUROSEMIDE 20 MG/1
20 TABLET ORAL DAILY
Status: DISCONTINUED | OUTPATIENT
Start: 2020-01-01 | End: 2020-01-01

## 2020-01-01 RX ORDER — ECHINACEA PURPUREA EXTRACT 125 MG
1 TABLET ORAL AS NEEDED
COMMUNITY
End: 2020-01-01

## 2020-01-01 RX ORDER — HYDRALAZINE HYDROCHLORIDE 20 MG/ML
10 INJECTION INTRAMUSCULAR; INTRAVENOUS EVERY 6 HOURS PRN
Status: DISCONTINUED | OUTPATIENT
Start: 2020-01-01 | End: 2020-01-01

## 2020-01-01 RX ORDER — FLUTICASONE PROPIONATE 50 MCG
2 SPRAY, SUSPENSION (ML) NASAL NIGHTLY
Status: DISCONTINUED | OUTPATIENT
Start: 2020-01-01 | End: 2020-01-01

## 2020-01-01 RX ORDER — CLINDAMYCIN PHOSPHATE 600 MG/50ML
600 INJECTION INTRAVENOUS ONCE
Status: COMPLETED | OUTPATIENT
Start: 2020-01-01 | End: 2020-01-01

## 2020-01-01 RX ORDER — ACETAMINOPHEN 325 MG/1
650 TABLET ORAL EVERY 4 HOURS PRN
Status: DISCONTINUED | OUTPATIENT
Start: 2020-01-01 | End: 2020-01-01

## 2020-01-01 RX ORDER — IPRATROPIUM BROMIDE AND ALBUTEROL SULFATE 2.5; .5 MG/3ML; MG/3ML
3 SOLUTION RESPIRATORY (INHALATION) 3 TIMES DAILY
Qty: 360 ML | Refills: 0 | Status: SHIPPED | OUTPATIENT
Start: 2020-01-01

## 2020-01-01 RX ORDER — LORAZEPAM 0.5 MG/1
0.5 TABLET ORAL NIGHTLY PRN
Status: DISCONTINUED | OUTPATIENT
Start: 2020-01-01 | End: 2020-01-01

## 2020-01-01 RX ORDER — FUROSEMIDE 20 MG/1
10 TABLET ORAL DAILY
Status: ON HOLD | COMMUNITY
End: 2020-01-01

## 2020-01-01 RX ORDER — SODIUM CHLORIDE 9 MG/ML
INJECTION, SOLUTION INTRAVENOUS CONTINUOUS
Status: ACTIVE | OUTPATIENT
Start: 2020-01-01 | End: 2020-01-01

## 2020-01-01 RX ORDER — DIPHENHYDRAMINE HCL 25 MG
25 CAPSULE ORAL ONCE
Status: COMPLETED | OUTPATIENT
Start: 2020-01-01 | End: 2020-01-01

## 2020-01-01 RX ORDER — FUROSEMIDE 10 MG/ML
20 INJECTION INTRAMUSCULAR; INTRAVENOUS ONCE
Status: COMPLETED | OUTPATIENT
Start: 2020-01-01 | End: 2020-01-01

## 2020-01-01 RX ORDER — FOLIC ACID/VIT B COMPLEX AND C 400 MCG
1 TABLET ORAL DAILY
Status: DISCONTINUED | OUTPATIENT
Start: 2020-01-01 | End: 2020-01-01

## 2020-01-01 RX ORDER — ACETAMINOPHEN 325 MG/1
650 TABLET ORAL ONCE
Status: DISCONTINUED | OUTPATIENT
Start: 2020-01-01 | End: 2020-01-01

## 2020-01-01 RX ORDER — MONTELUKAST SODIUM 10 MG/1
10 TABLET ORAL NIGHTLY
Status: DISCONTINUED | OUTPATIENT
Start: 2020-01-01 | End: 2020-01-01

## 2020-01-01 RX ORDER — LORAZEPAM 0.5 MG/1
0.5 TABLET ORAL EVERY 6 HOURS PRN
Status: DISCONTINUED | OUTPATIENT
Start: 2020-01-01 | End: 2020-01-01

## 2020-01-01 RX ORDER — ONDANSETRON 2 MG/ML
4 INJECTION INTRAMUSCULAR; INTRAVENOUS EVERY 6 HOURS PRN
Status: DISCONTINUED | OUTPATIENT
Start: 2020-01-01 | End: 2020-01-01

## 2020-01-01 RX ORDER — ZOLPIDEM TARTRATE 10 MG/1
5 TABLET ORAL NIGHTLY PRN
Status: DISCONTINUED | OUTPATIENT
Start: 2020-01-01 | End: 2020-01-01

## 2020-01-01 RX ORDER — FUROSEMIDE 20 MG/1
20 TABLET ORAL DAILY
Qty: 30 TABLET | Refills: 1 | Status: SHIPPED | OUTPATIENT
Start: 2020-01-01 | End: 2020-01-01

## 2020-01-01 RX ORDER — ZOLPIDEM TARTRATE 10 MG/1
5 TABLET ORAL NIGHTLY
Status: DISCONTINUED | OUTPATIENT
Start: 2020-01-01 | End: 2020-01-01

## 2020-01-01 RX ORDER — FUROSEMIDE 20 MG/1
TABLET ORAL
Qty: 30 TABLET | Refills: 1 | Status: SHIPPED | OUTPATIENT
Start: 2020-01-01 | End: 2020-01-01 | Stop reason: CLARIF

## 2020-01-01 RX ORDER — METOPROLOL SUCCINATE 25 MG/1
37.5 TABLET, EXTENDED RELEASE ORAL DAILY
COMMUNITY
End: 2020-01-01

## 2020-01-01 RX ORDER — POLYETHYLENE GLYCOL 3350 17 G/17G
17 POWDER, FOR SOLUTION ORAL DAILY PRN
Status: DISCONTINUED | OUTPATIENT
Start: 2020-01-01 | End: 2020-01-01

## 2020-01-01 RX ORDER — DIGOXIN 125 MCG
125 TABLET ORAL DAILY
Status: DISCONTINUED | OUTPATIENT
Start: 2020-01-01 | End: 2020-01-01

## 2020-01-01 RX ORDER — DIPHENHYDRAMINE HCL 25 MG
25 CAPSULE ORAL NIGHTLY PRN
Status: DISCONTINUED | OUTPATIENT
Start: 2020-01-01 | End: 2020-01-01

## 2020-01-01 RX ORDER — IPRATROPIUM BROMIDE AND ALBUTEROL SULFATE 2.5; .5 MG/3ML; MG/3ML
3 SOLUTION RESPIRATORY (INHALATION) EVERY 6 HOURS PRN
Status: DISCONTINUED | OUTPATIENT
Start: 2020-01-01 | End: 2020-01-01

## 2020-01-01 RX ORDER — CLINDAMYCIN PHOSPHATE 600 MG/50ML
600 INJECTION INTRAVENOUS EVERY 8 HOURS
Status: DISCONTINUED | OUTPATIENT
Start: 2020-01-01 | End: 2020-01-01

## 2020-01-01 RX ORDER — LORAZEPAM 0.5 MG/1
0.25 TABLET ORAL DAILY PRN
Status: DISCONTINUED | OUTPATIENT
Start: 2020-01-01 | End: 2020-01-01

## 2020-01-01 RX ORDER — DILTIAZEM HYDROCHLORIDE 120 MG/1
120 CAPSULE, EXTENDED RELEASE ORAL DAILY
Status: DISCONTINUED | OUTPATIENT
Start: 2020-01-01 | End: 2020-01-01

## 2020-01-01 RX ORDER — ALPRAZOLAM 0.25 MG/1
0.25 TABLET ORAL 3 TIMES DAILY PRN
Status: DISCONTINUED | OUTPATIENT
Start: 2020-01-01 | End: 2020-01-01

## 2020-01-01 RX ORDER — DIPHENHYDRAMINE HYDROCHLORIDE 50 MG/ML
12.5 INJECTION INTRAMUSCULAR; INTRAVENOUS ONCE
Status: COMPLETED | OUTPATIENT
Start: 2020-01-01 | End: 2020-01-01

## 2020-01-01 RX ORDER — GARLIC EXTRACT 500 MG
1 CAPSULE ORAL 2 TIMES DAILY
Status: DISCONTINUED | OUTPATIENT
Start: 2020-01-01 | End: 2020-01-01

## 2020-01-01 RX ORDER — LORAZEPAM 0.5 MG/1
0.25 TABLET ORAL 2 TIMES DAILY
Qty: 60 TABLET | Refills: 0 | Status: SHIPPED | OUTPATIENT
Start: 2020-01-01

## 2020-01-01 RX ORDER — DIPHENHYDRAMINE HCL 25 MG
25 CAPSULE ORAL ONCE
Status: CANCELLED | OUTPATIENT
Start: 2020-01-01

## 2020-01-01 RX ORDER — CLINDAMYCIN HYDROCHLORIDE 300 MG/1
300 CAPSULE ORAL 3 TIMES DAILY
Qty: 30 CAPSULE | Refills: 0 | Status: SHIPPED | OUTPATIENT
Start: 2020-01-01 | End: 2020-01-01

## 2020-01-01 RX ORDER — WARFARIN SODIUM 3 MG/1
3 TABLET ORAL DAILY
Status: ON HOLD | COMMUNITY
End: 2020-01-01

## 2020-01-01 RX ORDER — METOPROLOL SUCCINATE 50 MG/1
50 TABLET, EXTENDED RELEASE ORAL
Qty: 90 TABLET | Refills: 0 | Status: SHIPPED | OUTPATIENT
Start: 2020-01-01

## 2020-01-01 RX ORDER — METOPROLOL SUCCINATE 50 MG/1
50 TABLET, EXTENDED RELEASE ORAL
Status: DISCONTINUED | OUTPATIENT
Start: 2020-01-01 | End: 2020-01-01

## 2020-01-01 RX ORDER — KETOROLAC TROMETHAMINE 15 MG/ML
15 INJECTION, SOLUTION INTRAMUSCULAR; INTRAVENOUS ONCE
Status: COMPLETED | OUTPATIENT
Start: 2020-01-01 | End: 2020-01-01

## 2020-01-01 RX ORDER — METOPROLOL SUCCINATE 25 MG/1
25 TABLET, EXTENDED RELEASE ORAL EVERY EVENING
Status: DISCONTINUED | OUTPATIENT
Start: 2020-01-01 | End: 2020-01-01

## 2020-01-01 RX ORDER — FUROSEMIDE 10 MG/ML
20 INJECTION INTRAMUSCULAR; INTRAVENOUS DAILY
Status: DISCONTINUED | OUTPATIENT
Start: 2020-01-01 | End: 2020-01-01

## 2020-01-01 RX ORDER — LIDOCAINE HYDROCHLORIDE 20 MG/ML
20 INJECTION, SOLUTION EPIDURAL; INFILTRATION; INTRACAUDAL; PERINEURAL ONCE
Status: COMPLETED | OUTPATIENT
Start: 2020-01-01 | End: 2020-01-01

## 2020-01-01 RX ORDER — DILTIAZEM HYDROCHLORIDE 120 MG/1
120 CAPSULE, COATED, EXTENDED RELEASE ORAL DAILY
Status: ON HOLD | COMMUNITY
End: 2020-01-01

## 2020-01-01 RX ORDER — POTASSIUM CHLORIDE 1.5 G/1.77G
40 POWDER, FOR SOLUTION ORAL EVERY 4 HOURS
Status: DISCONTINUED | OUTPATIENT
Start: 2020-01-01 | End: 2020-01-01

## 2020-01-01 RX ORDER — METOPROLOL SUCCINATE 25 MG/1
25 TABLET, EXTENDED RELEASE ORAL DAILY
Status: DISCONTINUED | OUTPATIENT
Start: 2020-01-01 | End: 2020-01-01

## 2020-01-01 RX ORDER — CLONAZEPAM 0.5 MG/1
0.5 TABLET ORAL 2 TIMES DAILY PRN
Status: DISCONTINUED | OUTPATIENT
Start: 2020-01-01 | End: 2020-01-01

## 2020-01-01 RX ORDER — POTASSIUM CHLORIDE 20 MEQ/1
40 TABLET, EXTENDED RELEASE ORAL EVERY 4 HOURS
Status: COMPLETED | OUTPATIENT
Start: 2020-01-01 | End: 2020-01-01

## 2020-01-01 RX ORDER — ALBUTEROL SULFATE 90 UG/1
2 AEROSOL, METERED RESPIRATORY (INHALATION) EVERY 6 HOURS PRN
Status: DISCONTINUED | OUTPATIENT
Start: 2020-01-01 | End: 2020-01-01

## 2020-01-01 RX ORDER — FUROSEMIDE 20 MG/1
20 TABLET ORAL EVERY OTHER DAY
COMMUNITY

## 2020-01-01 RX ADMIN — DIPHENHYDRAMINE HCL 25 MG: 25 MG CAPSULE ORAL at 15:00:00

## 2020-01-01 RX ADMIN — FUROSEMIDE 20 MG: 10 INJECTION INTRAMUSCULAR; INTRAVENOUS at 16:07:00

## 2020-01-01 RX ADMIN — ACETAMINOPHEN 650 MG: 325 TABLET ORAL at 11:25:00

## 2020-01-01 RX ADMIN — ACETAMINOPHEN 650 MG: 325 TABLET ORAL at 15:00:00

## 2020-01-01 RX ADMIN — ACETAMINOPHEN 650 MG: 325 TABLET ORAL at 13:01:00

## 2020-01-01 RX ADMIN — ACETAMINOPHEN 650 MG: 325 TABLET ORAL at 12:58:00

## 2020-01-01 RX ADMIN — DIPHENHYDRAMINE HYDROCHLORIDE 12.5 MG: 50 INJECTION INTRAMUSCULAR; INTRAVENOUS at 13:38:00

## 2020-01-01 RX ADMIN — ACETAMINOPHEN 650 MG: 325 TABLET ORAL at 13:29:00

## 2020-09-14 PROBLEM — R79.89 AZOTEMIA: Status: ACTIVE | Noted: 2020-01-01

## 2020-09-14 PROBLEM — D69.6 THROMBOCYTOPENIA (HCC): Status: ACTIVE | Noted: 2020-01-01

## 2020-09-14 PROBLEM — R73.9 HYPERGLYCEMIA: Status: ACTIVE | Noted: 2020-01-01

## 2020-09-14 PROBLEM — D64.9 SYMPTOMATIC ANEMIA: Status: ACTIVE | Noted: 2020-01-01

## 2020-09-14 NOTE — PROGRESS NOTES
informed of consult by ER. Per Dr Itzel Kebede pt will be seen by Dr Nam Pan.      Christina London MD  [facility]

## 2020-09-14 NOTE — ED INITIAL ASSESSMENT (HPI)
Patient sent for low hgb 6.7 ordered by Dr. Dino Rae. Admits to fatigue for past 2 weeks. No GI bleeding that patient aware of. Chest xray done today, reports worsening shortness of breath.

## 2020-09-14 NOTE — H&P
[address] INTERNIST  History & Physical    Criselda Age Patient Status:  Emergency    1930 MRN [id number]   Location [address] Attending Harmony Simeon MD   [facility] Day # 0 PCP Danielito Puckett °C) (Temporal)   Resp 20   Ht 5' 3\" (1.6 m)   Wt 97 lb (44 kg)   SpO2 100%   BMI 17.18 kg/m²   General: No acute distress. Alert and oriented x 3. HEENT: Normocephalic atraumatic. Moist mucous membranes. EOM-I. PERRLA. Anicteric.   Neck: No lymphadenopath on 9/14/2020 at 2:47 PM       Ekg 12-lead    Result Date: 9/14/2020  Sinus rhythm with marked sinus arrythmia with 1st degree A-V block with Premature ventricular complexes or Fusion complexes Rightward axis Septal infarct , age undetermined ST & T wave ab albuterol given hx of afib         Disposition per   DVT prophylaxis–SCDs     See tests ordered,  Available and radiology reviewed  All consultant notes reviewed  Discussed with nursing on floor  dvt prophylaxis reviewed  PT and/or OT      Rac

## 2020-09-14 NOTE — ED PROVIDER NOTES
Patient Seen in: [facility] Emergency Department      History   Patient presents with:  Abnormal Result    Stated Complaint: low hgb    HPI    80year-old with a history of chronic atrial fibrillation on Coumadin, COPD, hypertension presents for clemente 97.8 °F (36.6 °C)   Temp src Temporal   SpO2 100 %   O2 Device None (Room air)       Current:/64   Pulse 78   Temp 97.8 °F (36.6 °C) (Temporal)   Resp 15   Ht 160 cm (5' 3\")   Wt 44 kg   SpO2 100%   BMI 17.18 kg/m²         Physical Exam    General: -----------         ------                     CBC W/ DIFFERENTIAL[[id number]]          Abnormal            Final result                 Please view results for these tests on the individual orders.    SCAN SLIDE   PATH COMMENT CBC   TYPE AND S (07/03/2019 7:30 PM CDT)  CBC & AUTO DIFFERENTIAL (07/03/2019 7:30 PM CDT)   Component Value Ref Range Performed At Pathologist Signature   WBC 2.8 (L) 4.2 - 11.0 K/mcL LUCIE Martines 1     RBC 3.30 (L) 4.00 - 5.20 mil/mcL ADVOCATE GOOD GURINDER R73.9 9/14/2020 Yes    Symptomatic anemia D64.9 9/14/2020 Unknown    Thrombocytopenia ([facility] [place] [facility] D69.6 9/14/2020 Yes

## 2020-09-15 NOTE — PLAN OF CARE
A/O x 4. Hearing aids bilaterally, glasses. Anxiety- ativan PO PRN Q 6 for anxiety  Enhanced precautions for pending COVID PCR  Tele Afib with PVC.  Coumadin on hold for INR > 3  Daily weights  Vegetarian, lactose free diet with 1200 ml fluid restriction patient/family  Outcome: Progressing  Goal: Maintain proper alignment of affected body part  Description  INTERVENTIONS:  - Support and protect limb and body alignment per provider's orders  - Instruct and reinforce with patient and family use of appropria

## 2020-09-15 NOTE — PHYSICAL THERAPY NOTE
PHYSICAL THERAPY EVALUATION - INPATIENT     Room Number: 9816/2100-R  Evaluation Date: 9/15/2020  Type of Evaluation: Initial  Physician Order: PT Eval and Treat    Presenting Problem: Symptomatic anemia  Reason for Therapy: Mobility Dysfunction and naps    OBJECTIVE  Precautions: Hard of hearing  Fall Risk: High fall risk    WEIGHT BEARING RESTRICTION  Weight Bearing Restriction: None                PAIN ASSESSMENT  Ratin          COGNITION  · Overall Cognitive Status:  WFL - within functional li guard assist  Distance (ft): 65  Assistive Device: Rolling walker  Pattern: Shuffle;L Foot flat;R Foot flat;L Flexed knee;R Flexed knee;Comment(mod kyphosis)  Stoop/Curb Assistance: Not tested       Skilled Therapy Provided: Pt presents to PT lying supine care/supervision. DISCHARGE RECOMMENDATIONS  PT Discharge Recommendations: Home with home health PT;24 hour care/supervision    PLAN  PT Treatment Plan: Bed mobility; Body mechanics; Endurance; Energy conservation;Patient education; Family education;Gait tra

## 2020-09-15 NOTE — PROGRESS NOTES
[address] [facility] INTERNIST  Progress Note     Bri Certain Patient Status:  Observation    1930 MRN [id number]   [facility] 3NE-A Attending Awa Sandy MD   Hosp Day # 0 PCP Mary Flowers MD     Chief Complaint: exertional dysp 9/14/2020  CONCLUSION:  Emphysematous change is noted. Coarse reticular densities lower lobes are probably result of chronic postinflammatory scar. Superimposed pneumonia is not excluded without the benefit of a comparison study.     Dictated by (CST): Pato metoprolol & diltiazem for rate control  -IV hydralazine PRN for elevated SBP >150/160s  -cardiology follow up      Hyperglycemia  - no hx of DM, will monitor chemsticks, start ISS if indicated      COPD   -not actively wheezing, no longer taking any inhal

## 2020-09-15 NOTE — DIETARY NOTE
[facility]    NUTRITION INITIAL ASSESSMENT    Pt does not meet malnutrition criteria. NUTRITION DIAGNOSIS/PROBLEM:    Underweight related to physiological causes as evidenced by BMI of 16.4.     NUTRITION INTERVENTION:       1. RD Malnutrition Care d/t COVID-19 protocol.     NUTRITION PRESCRIPTION: Current wt: 42 kg  Calories: 1019-0247 calories/day (30-35 calories per kg)  Protein: 50-63 grams protein/day (1.2-1.5 grams protein per kg)  Fluid: ~1 ml/kcal or per MD discretion    MONITOR AND EVALUATE/N

## 2020-09-15 NOTE — PLAN OF CARE
Assumed care at 0730. A&O x 4. On RA tolerating well. A fib on tele. IV lasix given BID. Potassium replaced per protocol. COVID PCR negative, isolation d/c'd. PT recommending 24 hour supervision at discharge. Social work on for caregiver needs.  Staff will appropriate  - Communicate ordered activity level and limitations with patient/family  9/15/2020 1732 by Megan Patel RN  Outcome: Progressing  9/15/2020 1206 by Megan Patel, RN  Outcome: Progressing  Goal: Maintain proper alignment of affected bod Offer food and liquids at a slow rate  - No straws  - Encourage small bites of food and small sips of liquid  - Offer pills one at a time, crush or deliver with applesauce as needed  - Discontinue feeding and notify MD (or speech pathologist) if coughing o

## 2020-09-15 NOTE — CM/SW NOTE
09/15/20 1500   CM/SW Referral Data   Referral Source Physician;Social Work (self-referral)   Reason for Referral Discharge [address]  (Dtr  Robin Rubin)   Patient Info   Patient's Mental Status Alert;Memory Impairments   Patient Stat

## 2020-09-15 NOTE — CONSULTS
[facility] Cardiology  Consultation Note      Landon Box Patient Status:  Observation    1930 MRN [id number]   [facility] 3NE-A Attending Avelino Anderson MD   Hosp Day # 0 PCP Richard Medellin MD     Outpatient cardi Intravenous, Daily  Ipratropium Bromide (ATROVENT) 0.02 % nebulizer solution 0.5 mg, 0.5 mg, Nebulization, Q6H PRN  LORazepam (ATIVAN) tab 0.5 mg, 0.5 mg, Oral, Q6H PRN  hydrALAzine HCl (APRESOLINE) injection 10 mg, 10 mg, Intravenous, Q6H PRN  diltiazem ( thyromegaly  Neck: Supple; no JVD; no carotid bruits  Cardiac: Regular rate and regular rhythm; no murmurs/rubs/gallops are appreciated; PMI is non-displaced; there is no evidence of a sternal heave  Lungs: bibasilar crackles, no accessory muscle use is no

## 2020-09-15 NOTE — PLAN OF CARE
NURSING ADMISSION NOTE  Admission database completed. Patient admitted via [address] to room. Safety precautions initiated. Bed in low position. Call light in reach.

## 2020-09-15 NOTE — CONSULTS
[facility]  Report of Consultation    Alexa Soto Patient Status:  Inpatient    1930 MRN [id number]   [facility] 3NE-A Attending Jeb Tate MD   Hosp Day # 1 PCP Александр Lr MD     Reason for Consultation:     The 0.5 mg, Nebulization, Q6H PRN  •  hydrALAzine HCl (APRESOLINE) injection 10 mg, 10 mg, Intravenous, Q6H PRN  •  diltiazem (CARDIZEM CD) 24 hr cap 120 mg, 120 mg, Oral, Daily    Review of Systems:  The pertinent positives and negatives were described.  All o accentuated by portable technique. Chest wall structures are otherwise unremarkable.    =====  CONCLUSION:  Emphysematous change is noted. Coarse reticular densities lower lobes are probably result of chronic postinflammatory scar.   Superimposed pneum

## 2020-09-16 NOTE — BH PROGRESS NOTE
[facility] Resource Referral Counselor Note    Jose Pierre Patient Status:  Inpatient    1930 MRN [id number]   [facility] 3NE-A Attending Mabel Lin MD   Hosp Day # 2 PCP Zuleika Howell MD       S(subjective) T

## 2020-09-16 NOTE — PROGRESS NOTES
[address] [facility] INTERNIST  Progress Note     Rene Edge Patient Status:  Observation    1930 MRN [id number]   [facility] 3NE-A Attending Nhung Hernandez MD   Hosp Day # 2 PCP Tanja Cole MD     Chief Complaint: exertional dysp 09/14/20  1044 09/14/20  1400 09/15/20  0740   PTP  --  32.9* 28.9*   INR 3.2* 3.16* 2.67*            [unfilled]    Imaging: Xr Chest Ap Portable  (cpt=71045)    Result Date: 9/14/2020  CONCLUSION:  Emphysematous change is noted.   Coarse reticular densities with hx chronic afib on coumadin  -EKG on admission showing NSR with PVC, 1 degree AV block   -HR controlled 70-80s   -INR 3.3 on admission   Plan:  -cardiology consult   -holding coumadin for now as per cards, likely will hold even in outpatient   -c/w me

## 2020-09-16 NOTE — PLAN OF CARE
Pt is A&O x4. [place]- bilat hearing aids present. VSS- chronic controlled Afib on tele. SpO2 remains stable on RA- lung sounds diminished with occasional expiratory wheezing. Pt denies pain/nausea/SOB/dizziness.  Pt received lasix during day shift and continue soft bristle tooth brush  - Limit straining and forceful nose blowing  Outcome: Progressing     Problem: MUSCULOSKELETAL - ADULT  Goal: Return mobility to safest level of function  Description  INTERVENTIONS:  - Assess patient stability and activity tolera upright for all feedings (90 degrees preferred)  - Offer food and liquids at a slow rate  - No straws  - Encourage small bites of food and small sips of liquid  - Offer pills one at a time, crush or deliver with applesauce as needed  - Discontinue feeding

## 2020-09-16 NOTE — PLAN OF CARE
Patient is A&Ox4. Torres Martinez, bilateral hearing aids used. Controlled a.fib on telemetry, VSS, afebrile. IV lasix given. Fluids restricted. Saturating 98% on RA. Denies CP or SOB. No cough or difficulty swallowing observed. K replaced per protocol. function  - Promote sitting position while performing ADLs such as feeding, grooming, and bathing  - Educate and encourage patient/family in tolerated functional activity level and precautions during self-care  - Encourage patient to incorporate impaired s

## 2020-09-16 NOTE — PROGRESS NOTES
Heme/Onc Progress Note - [facility]      Chief Complaint:    Follow up for evaluation and management of pancytopenia consistent with bone marrow failure. Interim History:      The patient is sitting up in a chair at the bedside. She feels a little better.  She are otherwise unremarkable.    =====  CONCLUSION: Emphysematous change is noted. Coarse reticular densities lower lobes are probably result of chronic postinflammatory scar. Superimposed pneumonia is not excluded without the benefit of a comparison study.

## 2020-09-17 NOTE — PROGRESS NOTES
[address] [facility] INTERNIST  Progress Note     Ezio Zuñiga Patient Status:  Observation    1930 MRN [id number]   [facility] 3NE-A Attending Grisel Macdonald MD   Hosp Day # 3 PCP Elham Pimentel MD     Chief Complaint: exertional dysp 23  --  21 18   BILT 0.8  --  0.6 0.6   TP 6.7  --  6.9 6.0*       Recent Labs   Lab 09/15/20  0740 09/16/20  1128 09/17/20  0633   PTP 28.9* 26.0* 22.0*   INR 2.67* 2.31* 1.87*            [unfilled]    Imaging: No results found.     Medications:   • Metopro no hx of DM, will monitor chemsticks, start ISS if indicated      COPD   -not actively wheezing, no longer taking any inhalers  -atrovent PRN for wheezing, avoid albuterol given hx of afib         Disposition- plan for DC today with home PT and HHA  DVT pr

## 2020-09-17 NOTE — PLAN OF CARE
Patient A&O x4, no c/o pain, lungs diminished. Patient walked the halls before bed. Had a very large formed BM, occult blood negative. No issue to note through evening.   C/o nausea this AM, no anti-nausea meds ordered at this time, but patient was able

## 2020-09-17 NOTE — PROGRESS NOTES
[facility] Cardiology Progress Note - Ace Ruiz Patient Status:  Inpatient    1930 MRN [id number]   [facility] 3NE-A Attending Anibal Thacker MD   Hosp Day # 3 PCP Leanna Woodson MD     Subjective: heave or extra cardiac sounds. Lungs: Clear without wheezes, rales, rhonchi or dullness. Normal excursions and effort. Abdomen: Soft, non-tender. No organosplenomegally, mass or rebound, BS-present. Extremities: Without clubbing, cyanosis or edema.   Pe Oral, Daily        ROS:  General Health: otherwise feels well, weight stable  Constitutiona: no recent fevers  Skin: denies any unusual skin lesions or rashes  Eyes: no visual complaints or deficits  HEENT: denies nasal congestion, sinus pain; hearing loss

## 2020-09-17 NOTE — PLAN OF CARE
Belgica Handy  5/26/1930  Temp: 98.2 °F (36.8 °C)  Pulse: 83  Resp: 18  BP: 104/55    Ok for dc per primary care and hemo/onc. Cards paged at noon to see if they are ok with patient dc and for coumadin recs. Dr Fantasma Ross to see patient per APN.        Ishan Martinez

## 2020-09-17 NOTE — PROGRESS NOTES
Heme/Onc Progress Note - [facility]      Chief Complaint:    Follow up for evaluation and management of pancytopenia consistent with bone marrow failure. Interim History:      She feels a little better. She has no new pain.  She has no no dyspnea at rest. probably result of chronic postinflammatory scar. Superimposed pneumonia is not excluded without the benefit of a comparison study.       Impression:     Macrocytic anemia:  Leukopenia with neutropenia  Thrombocytopenia  Circulating blasts     This is consi

## 2020-10-09 NOTE — PROGRESS NOTES
[facility] Progress Note    Patient Name: Emily Moreno   YOB: 1930   Medical Record Number: [id number]   CSN: [id number]   Date of visit: 10/9/2020   Provider: MORELIA Menon  Referring Physician: No ref.  provider found    P (90 Base) MCG/ACT Inhalation Aero Soln, Inhale 2 puffs into the lungs every 6 (six) hours as needed for Wheezing., Disp: , Rfl:   •  dilTIAZem HCl ER Coated Beads 120 MG Oral Capsule SR 24 Hr, Take 1 capsule (120 mg total) by mouth daily. , Disp: 90 capsule

## 2020-10-09 NOTE — TELEPHONE ENCOUNTER
Was seen by Dr Guo Officer for pancytopenia in hospital.  She had labs yesterday with Dr Fabio Woods and they were told her Hgb 7.0 and to contact Dr Guo Officer. She is sob, denies chest pain, very weak, feels like fluid in lungs. No fevers or known Covid exposure.     AC

## 2020-10-09 NOTE — PROGRESS NOTES
Patient presents with: Follow - Up: apn assessment    Patient present to clinic in wheelchair c/o feeling very weak, fatigue with shortness of breath with walking with walk.   Patient also has occasional cough in the morning and noticed some fluid in her l

## 2020-10-09 NOTE — PROGRESS NOTES
Pt here for blood transfusion.   Arrives Via wheelchair, accompanied by Self           Patient reports possible pregnancy since last therapy cycle: Not Applicable    Modifications in dose or schedule: No     Frequency of blood return and site check througho

## 2020-10-12 NOTE — TELEPHONE ENCOUNTER
Unable to make it this week- is unsure of why she would be getting another transfusion as her PCP said she would only need one a month. Wants to come next week Monday or Tuesday if possible. Please give her a call.

## 2020-10-16 PROBLEM — I50.31 ACUTE HEART FAILURE WITH PRESERVED EJECTION FRACTION (HFPEF) (HCC): Status: ACTIVE | Noted: 2020-01-01

## 2020-10-18 PROBLEM — R06.02 SHORTNESS OF BREATH: Status: ACTIVE | Noted: 2020-01-01

## 2020-10-18 NOTE — ED PROVIDER NOTES
Patient Seen in: [facility] Emergency Department      History   Patient presents with:  Difficulty Breathing    Stated Complaint: SOB     HPI    Patient has a history of MDS, chronic A. fib, COPD, here for evaluation of shortness of breath.   Grace James is no tenderness. There is no guarding. Musculoskeletal: Exhibits no edema or tenderness. Neurological: Pt is alert and oriented to person, place, and time. No cranial nerve deficit. Skin: Skin is warm and dry.    Psychiatric: Normal mood and af with PVCs. I have personally visualized the Radiology studies and agree with interpretation from radiology.     Xr Chest Ap Portable  (cpt=71045)    Result Date: 10/18/2020  PROCEDURE:  XR CHEST AP PORTABLE  (CPT=71045)  TECHNIQUE:  AP chest ra Medication List                       Present on Admission  Date Reviewed: 10/16/2020          ICD-10-CM Noted POA    Shortness of breath R06.02 10/18/2020 Unknown

## 2020-10-18 NOTE — ED INITIAL ASSESSMENT (HPI)
Pt to ED from home with family with reports of episode of difficulty breathing earlier today. Pt reports \"I just couldn't catch my breath. \" Pt reports coughing but denies fever. Pt currently reports feeling better.

## 2020-10-19 PROBLEM — D46.Z MYELODYSPLASTIC SYNDROME, HIGH GRADE (HCC): Status: ACTIVE | Noted: 2020-01-01

## 2020-10-19 NOTE — OCCUPATIONAL THERAPY NOTE
Attempted to see patient for OT evaluation this am, however patient off floor at [address]. Will re-attempt as schedule permits.

## 2020-10-19 NOTE — CM/SW NOTE
10/19/20 1000   CM/SW Screening   Referral Source Social Work (self-referral)   Patient's Mental Status Alert   Patient's [address]   Patient lives with Caregiver  (24/7)   Discharge Needs   Anticipated D/C needs To be determined   SW spoke

## 2020-10-19 NOTE — CONSULTS
[facility]  Report of Consultation    Gee Fail Patient Status:  Observation    1930 MRN [id number]   [facility] 4NW-A Attending Alessandra Carvajal MD   Hosp Day # 0 PCP Lucretia Macias MD     Reason for Consultation:    T (DUONEB) nebulizer solution 3 mL, 3 mL, Nebulization, Q6H PRN  •  acetaminophen (TYLENOL) tab 650 mg, 650 mg, Oral, Q6H PRN  •  docusate sodium (COLACE) cap 100 mg, 100 mg, Oral, BID  •  PEG 3350 (MIRALAX) powder packet 17 g, 17 g, Oral, Daily PRN  •  onda Negative.    =====  CONCLUSION:  No DVT. CXR yesterday:  FINDINGS:       Cardiac silhouette is stable. Pulmonary vasculature is unchanged. Interstitial abnormalities in the lungs are noted. Minimal bronchiectasis is noted. No pneumothorax.     =

## 2020-10-19 NOTE — PLAN OF CARE
Pt Aox4. VSS. O2 % on room air. Pt reports intermittent shortness of breath, but managed by sitting upright and taking deep breaths. Possible 1u PRBC transfusion prior to discharge. US doppler negative for DVT's. Cardiac diet. Call light in reach.  Wi

## 2020-10-19 NOTE — CONSULTS
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 Head: Normocephalic, without obvious abnormality, atraumatic. Eyes: Conjunctivae/corneas clear. No scleral icterus. No conjunctival     hemorrhage. Nose: Nares normal.   Throat: Lips, mucosa, and tongue normal.  No thrush noted.    Neck: S contributing factors. 3. Episodic shortness of breath with an inability to speak sound to be laryngospastic episodes which may bolster the concern for aspiration  4. Chronic atrial fibrillation.   The patient had been on anticoagulation until the recent pa

## 2020-10-19 NOTE — PROGRESS NOTES
NURSING ADMISSION NOTE      Patient admitted via Cart  Oriented to room. Safety precautions initiated. Bed in low position. Call light in reach. Pt admitted from ED. Pt alert and oriented x4, hard of hearing, bilateral hearing aides noted.  VSS, a

## 2020-10-19 NOTE — PROGRESS NOTES
[facility]    NON FACE TO FACE VISIT FOR INITIAL ADMIT/ REVIEW OF MEDS/ ORDERS GIVEN/ DISCUSSION WITH ER MD Alistair Lema Patient Status:  Emergency    1930 MRN [id number]   Location [address] [place] [address] Attending Hung Contreras, (43.7 kg), SpO2 99 %.      SEE EXAM PER ER MD  Results:     Lab Results   Component Value Date    WBC 1.3 (L) 10/18/2020    HGB 8.2 (L) 10/18/2020    HCT 25.5 (L) 10/18/2020    PLT 79.0 (L) 10/18/2020    CREATSERUM 1.62 (H) 10/18/2020    BUN 38 (H) 10/18/20 doses 10-40 mg over past few weeks, Cr slightly elevated will hold for now   -procal negative  Plan:  -pulmonary consult placed, may consider CT chest  -cardiology consult   -meera PRN   -frequent chest PT, respiratory therapy   -f/u proBNP     MDS with

## 2020-10-19 NOTE — H&P
[address] [facility] INTERNIST  History & Physical    Kylieyaakov Edge Patient Status:  Observation    1930 MRN [id number]   [facility] 4NW-A Attending Nhung Hernandez MD   Hosp Day # 0 PCP Eloina Ball MD     Date: Take 1 tablet (25 mg total) by mouth daily. •  Albuterol Sulfate HFA (PROAIR HFA) 108 (90 Base) MCG/ACT Inhalation Aero Soln, Inhale 2 puffs into the lungs every 6 (six) hours as needed for Wheezing.     •  Fluticasone Propionate 50 MCG/ACT Nasal Suspens 10/19/2020    DDIMER 1.29 (H) 10/19/2020    ESRML 48 (H) 10/19/2020    CRP <0.29 10/19/2020    MG 2.3 10/19/2020    PHOS 3.1 09/15/2020    TROP <0.045 10/18/2020    B12 694 09/15/2020       Us Venous Doppler Leg Bilat - Diag Img (cpt=93970)    Result Date: antiplatelets   -trend cbc, possible 1 unit PRBC to be transfused prior to dc      Chronic Afib   -heart rate controlled, off coumadin now due to elevated bleeding risk   -c/w metoprolol and diltiazem   -cardiology consult placed      Chronic diastolic CHF

## 2020-10-19 NOTE — PLAN OF CARE
Problem: Impaired Swallowing  Goal: Minimize aspiration risk  Description: Interventions:  - Patient should be alert and upright for all feedings (90 degrees preferred)  - Offer food and liquids at a slow rate  - Straws ok  - Encourage small bites of sophie

## 2020-10-19 NOTE — SLP NOTE
ADULT SWALLOWING EVALUATION    ASSESSMENT    ASSESSMENT/OVERALL IMPRESSION:  Orders were received for a bedside swallowing evaluation to r/o aspiration. Patient admitted from NH for with r/o influenza.  Patient contacted at the bedside with patient awake an disease, unspecified [facility] 8/19/2015   • Elevated BP 8/19/2015       Prior Living Situation: Skilled nursing facility  Diet Prior to Admission: Regular; Thin liquids  Precautions: Aspiration    Patient/Family Goals:  To eat and drink    SWALLOWING Pathologist  Pager# [phone]

## 2020-10-20 NOTE — HOME CARE LIAISON
Received referral from PAT Mccormack. Met with patient at the bedside and provided choice. Patient is agreeable to [facility]. Residential brochure provided with contact information. All questions addressed and answered.

## 2020-10-20 NOTE — SLP NOTE
SPEECH DAILY NOTE - INPATIENT    ASSESSMENT & PLAN   ASSESSMENT  Pt seen for dysphagia tx to assess tolerance with recommended diet, ensure proper utilization of aspiration precautions and provide pt/family education. Contacted patient at the bedside.  She

## 2020-10-20 NOTE — PROGRESS NOTES
Hem/Onc Inpatient Note    Patient Name: Vivek Neal   YOB: 1930   Medical Record Number: [id number]   CSN: [id number]   Attending Hematology/Oncology Physician: Dr. Hilda Mcintyre: patient is laying in bed. Requesting \"more water\".  Report Total Protein 5.6 (L) 6.4 - 8.2 g/dL    Albumin 2.8 (L) 3.4 - 5.0 g/dL    Globulin  2.8 2.8 - 4.4 g/dL    A/G Ratio 1.0 1.0 - 2.0   CBC W/ DIFFERENTIAL    Collection Time: 10/20/20  6:54 AM   Result Value Ref Range    WBC 1.7 (L) 4.0 - 11.0 x10(3) uL    RB

## 2020-10-20 NOTE — PHYSICAL THERAPY NOTE
PHYSICAL THERAPY EVALUATION - INPATIENT     Room Number: 402/402-A  Evaluation Date: 10/20/2020  Type of Evaluation: Initial  Physician Order: PT Eval and Treat    Presenting Problem: dyspnea  Reason for Therapy: Mobility Dysfunction and Discharge Pl MOTION AND STRENGTH ASSESSMENT  Upper extremity ROM and strength- see OT eval    Lower extremity ROM is within functional limits     Lower extremity strength is within functional limits except for the following:    Right Knee extension  4/5  Left Knee exte impulsively due to weakness. 2nd rep sit-stand with RW and CGA. Pt ambulated 25ft with RW and MIN assist within room. Pt ambulates with shuffle gait pattern. Pt reports dyspnea and weakness and requests to return to chair.  Pt returned to sitting up in beds training  Rehab Potential : Fair  Frequency (Obs): 3-5x/week  Number of Visits to Meet Established Goals: 5      CURRENT GOALS    Goal #1 Patient is able to demonstrate supine - sit EOB @ level: supervision     Goal #2 Patient is able to demonstrate transf

## 2020-10-20 NOTE — PLAN OF CARE
Pt alert and oriented x4. Hard of hearing, bilateral hearing aides noted. VSS, afebrile. Denies pain. C/o SOB at times not related to anything specific. Ambulatory in the hallway, tolerated well. Denies nausea.  C/o some anxiety and restlessness, MD paged,

## 2020-10-20 NOTE — PROGRESS NOTES
[address] [facility] INTERNIST  Progress Note     Jaime Valle Patient Status:  Observation    1930 MRN [id number]   [facility] 4NW-A Attending Antonella Yoo MD   Hosp Day # 0 PCP Rachel Tyson MD     Chief Complaint: dyspnea     S: Dictated by (CST): Larry Adan MD on 10/19/2020 at 11:20 AM     Finalized by (CST): Larry Adan MD on 10/19/2020 at 11:21 AM       Xr Chest Ap Portable  (cpt=71045)    Result Date: 10/18/2020  CONCLUSION:  No consolidation.     Dictated by (CST) be due to contraction diuresis   -holding lasix for now, Cr back to baseline at 0.81  -cardiology follow up   -daily bmp    Disposition per   DVT prophylaxis–scd  DC planning for tomorrow   Pt and family wishes for her to be DNR/DNI full treat

## 2020-10-20 NOTE — PLAN OF CARE
Pt Aox4. VSS. No reports of pain. No spells of dyspnea. Nebs ordered QID PRN, 1 dose given this AM. Pt ambulated in room with PT using walker. Per hem/onc, no Neupogen necessary for ANC 0.48. Possible 1u PRBC to be given prior to discharge.  Call light in r

## 2020-10-20 NOTE — PROGRESS NOTES
[facility]  Progress Note    Rylie Garcia Patient Status:  Observation    1930 MRN [id number]   [facility] 4NW-A Attending Lawanda Ramirez MD   Hosp Day # 0 PCP Georgeann Schlatter, MD     Subjective:  Genianikolaykm Garcia is a( 10/18/20  1506 10/19/20  0532 10/20/20  0654   * 76 84   BUN 38* 39* 28*   CREATSERUM 1.62* 1.03* 0.81   GFRAA 32* 55* 74   GFRNAA 28* 48* 64   CA 9.0 8.5 8.6    140 141   K 4.3 4.2 3.9    108 109   CO2 28.0 29.0 29.0     Recent Labs   L

## 2020-10-20 NOTE — CONSULTS
[address]    PATIENT'S NAME: Clark Weston   ATTENDING PHYSICIAN: Conrad Mccain M.D.   CONSULTING PHYSICIAN: Conrad Mccain M.D.    PATIENT ACCOUNT#:   [de-identified]    LOCATION:  [address] [place] [address]  MEDICAL RECORD #:   [id number]       DATE OF BIR The patient lives in her own apartment. Caregivers or family are providing 24-hour care. No alcohol. Uses a walker. FAMILY HISTORY:  Includes father who  in his 46s from heart disease.      REVIEW OF SYSTEMS:  Obtained and was negative including restart diuretics at this time. In fact, with her acute renal insufficiency, one can argue she may have been on the dry side. 6. Call out to daughter Bravo Kahn   7.      Dictated By Deborah Stanley M.D.  d: 10/19/2020 17:42:14  t: 10/19/2020 20:01:22  Job 28

## 2020-10-20 NOTE — PROGRESS NOTES
10/20/20 1423   Clinical Encounter Type   Visited With Health care provider  (Patient sleeping soundly.)   Referral To Nurse  ( provided POLST form to patient's chart.  Please page a  at pager [phone] for POLST conversation when patient awak

## 2020-10-20 NOTE — PROGRESS NOTES
[facility] Cardiology/[facility]    Progress Note    Nela Running  80year old  [id number]  MD Kiersten Obrien MD    ASSESSMENT AND PLAN:    1. Dyspnea and cough, improved, ? aspiration. 2.   Atrial fibrillation, chronic.    3.   C 97.2 °F (36.2 °C), temperature source Oral, resp. rate 16, height 4' 11.06\" (1.5 m), weight 102 lb 8.2 oz (46.5 kg), SpO2 100 %. GENERAL:  She is a pleasant, petite lady in no distress. HEENT:  Head is normocephalic. Eyes are nonicteric.   There is s Metoprolol Succinate ER (Toprol XL) 24 hr tab 25 mg, 25 mg, Oral, Daily  •  Fluticasone Propionate (FLONASE) 50 MCG/ACT nasal spray 2 spray, 2 spray, Nasal, Nightly  •  ipratropium-albuterol (DUONEB) nebulizer solution 3 mL, 3 mL, Nebulization, Q6H PRN  •

## 2020-10-21 NOTE — PROGRESS NOTES
[address] [facility] INTERNIST  Progress Note     Ezio Zuñiga Patient Status:  Observation    1930 MRN [id number]   [facility] 4NW-A Attending Grisel Macdonald MD   Hosp Day # 0 PCP Anna Prince MD     Chief Complaint: dyspnea     S: Bilateral lower lobe atelectasis and or scar.    Dictated by (CST): Lindsey Linder MD on 10/21/2020 at 3:01 PM     Finalized by (CST): Lindsey Linder MD on 10/21/2020 at 3:09 PM         Medications:   • Montelukast Sodium  10 mg Oral Nightly   • ipratropium-albuter now, Cr back to baseline  -cardiology follow up   -daily bmp    Disposition per   DVT prophylaxis–scd  DC planning for tomorrow   Pt and family wishes for her to be DNR/DNI full treatment    See tests ordered,  Available and radiology reviewed

## 2020-10-21 NOTE — PLAN OF CARE
Problem: Impaired Activities of Daily Living  Goal: Achieve highest/safest level of independence in self care  Description: Interventions:  - Assess ability and encourage patient to participate in ADLs to maximize function  - Promote sitting position [facility]

## 2020-10-21 NOTE — OCCUPATIONAL THERAPY NOTE
OCCUPATIONAL THERAPY EVALUATION - INPATIENT     Room Number: 402/402-A  Evaluation Date: 10/20/2020  Type of Evaluation: Initial  Presenting Problem: SOB, cough     Physician Order: IP Consult to Occupational Therapy  Reason for Therapy: ADL/IADL Dysfuncti SUBJECTIVE   \"I am alarmed at how weak I am.\"     Patient self-stated goal is to be stronger     OBJECTIVE  Precautions: Hard of hearing  Fall Risk: High fall risk    WEIGHT BEARING RESTRICTION  Weight Bearing Restriction: None                PAIN AS and pt's RN was notified of pt's present position and condition. Patient End of Session: Up in chair;Needs met;Call light within reach;RN aware of session/findings; All patient questions and concerns addressed; Alarm set    ASSESSMENT     Patient is a 80 y training;Equipment eval/education; Compensatory technique education;Continued evaluation  Rehab Potential : Fair  Frequency (Obs): 3-5x/week  Number of Visits to Meet Established Goals: 5    ADL Goals   Patient will perform lower body dressing:  with superv

## 2020-10-21 NOTE — CM/SW NOTE
JOSÉ MIGUEL spoke w/pt regarding YI recommendation. Pt stated, \"I would do better in my own home. \" Pt has 24 hour care. Pt agreeable to HHPT at [facility].  JOSÉ MIGUEL paged [facility] w/referral.

## 2020-10-21 NOTE — PROGRESS NOTES
Heme/Onc Progress Note - [facility]      Chief Complaint:    Follow up for evaluation and management of pancytopenia. Interim History:      The patient has no new complaints.      Physical Examination:    Vital Signs: /53 (BP Location: Right arm)   Pulse ABDOMEN:  Limited images of the upper abdomen demonstrate anatomic variation of the branching patterns of the celiac axis. The common hepatic and left gastric arteries originate from the abdominal aorta. BONES:  Mild degenerative change.   No bony lesio

## 2020-10-21 NOTE — PROGRESS NOTES
[facility]  Cardiology Progress Note    Lc Bloom Patient Status:  Observation    1930 MRN [id number]   [facility] 4NW-A Attending Citlaly Bustillos MD   Hosp Day # 0 PCP Yo Serna MD     Assessment:  SOB-Pulmonary 25 mg Oral Daily   • Fluticasone Propionate  2 spray Nasal Nightly   • docusate sodium  100 mg Oral BID     • sodium chloride           Recent Labs     10/19/20  0532 10/20/20  0654 10/21/20  0629   WBC 2.0* 1.7* 2.0*   HGB 7.3* 7.3* 7.0*   PLT 76.0* 66.0*

## 2020-10-21 NOTE — PLAN OF CARE
1930 received patient in handoff. She is sitting at 90 degree angle in bed \" I can't lay flat\" Neb Rx given by RT. Ralph brenner. Juan F Riley had a BM prior day. Walked in halls with walker at 2300 as \" she didn't get enough exercise\". 0145 c/o feeling SOB. GYN ONCOLOGY FOLLOW-UP    Tracy Benz  [id number]  1961    Chief Complaint: Follow-up (vaignal discharge, bloating)        History of present illness:  Tracy Benz is a 57 y.o. year old female who is here today for complaint of new vaginal discharge and bloating. She is under surveillance for endometrial cancer diagnosed in 2016, see history below. There was no evidence of disease upon her last exam on 8/24/2018 and she has done well since completion of treatment.   Upon arrival today she c/o new yellow vaginal discharge with mild itching and irritation that began 2 days ago. She has also noticed bloating in this time frame. Earlier this month she had some constipation, without bloating, but this resolved on its own. She denies vaginal bleeding or spotting. She denies pelvic pain. Bloating is absent in the morning, but then present after lunch and in the evenings. She states it may correlate to what she is eating, but she is unsure. Bowels and bladder are otherwise normal. She denies appetite changes.     Oncology History:       Endometrial cancer (CMS/HCC)    11/30/2016 Initial Diagnosis     Hysteroscopy D&C with MyoSure and cervical polypectomy with Dr. Tilley for AUB. Pathology revealed grade 1 endometrioid adenocarcinoma and polypoid tissue from the lower uterine segment and endocervix showed stromal invasion.         12/9/2016 Imaging     Pre-OP CT negative for evidence of metastasis         12/20/2016 Surgery     RTLH/BSO with limited lymph node dissection. Frozen section showed no residual cancer. Final pathology revealed small focus of grade 1 endometrioid adenocarcinoma superficially invasive (2mm / 2.5 cm). There was no additional cervical involvement, all sampled nodes were negative, no LVSI, and tissue insufficient for MSI testing. Final Stage II         2/22/2017 - 3/3/2017 Radiation     Brachytherapy with Dr. Livingston. The upper vagina received 30 Gy in 5 fractions utilizing a vaginal  cylinder.          1/18/2018 Survivorship     Survivorship Care Plan completed and discussed with patient.  Copy of Survivorship Care Plan provided to patient and primary care provider.             Past Medical History:   Diagnosis Date   • Anxiety    • Arthritis    • BMI 38.0-38.9,adult    • Diabetes mellitus type 2, diet-controlled (CMS/HCC)    • Endometrial cancer (CMS/HCC)    • Hyperlipemia    • Hypertension    • Palpitations        Past Surgical History:   Procedure Laterality Date   • BREAST BIOPSY     • KNEE ARTHROSCOPY W/ PARTIAL MEDIAL MENISCECTOMY     • LAPAROSCOPIC CHOLECYSTECTOMY  1999   • TONSILLECTOMY     • TOTAL LAPAROSCOPIC HYSTERECTOMY N/A 12/20/2016    RATLH/BSO/Staging       MEDICATIONS: The current medication list was reviewed and reconciled.     Allergies:  has No Known Allergies.    Family History   Problem Relation Age of Onset   • Hypertension Mother    • Diabetes type II Mother    • Heart failure Mother    • Diabetes type II Father    • Heart attack Father    • Prostate cancer Maternal Uncle 60         Last imaging study was pre-op CT 12/15/2016.   Tumor marker:      Date Value Ref Range Status   12/19/2016 14.0 0.0 - 30.2 U/mL Final       Review of Systems   Constitutional: Negative for appetite change, chills, fatigue, fever and unexpected weight change.   Respiratory: Negative for cough, shortness of breath and wheezing.    Cardiovascular: Negative for chest pain, palpitations and leg swelling.   Gastrointestinal: Negative for abdominal pain, blood in stool, constipation, diarrhea, nausea and vomiting.        C/o bloating x 2 days, worse in PM   Endocrine: Negative.    Genitourinary: Positive for vaginal discharge (x 2 days). Negative for dysuria, frequency, genital sores, hematuria, pelvic pain, urgency, vaginal bleeding and vaginal pain.   Musculoskeletal: Negative for arthralgias, gait problem and joint swelling.   Neurological: Negative for dizziness, seizures, syncope,  weakness, light-headedness, numbness and headaches.   Hematological: Negative for adenopathy.   Psychiatric/Behavioral: Negative.        Physical Exam  Vital Signs: BP (!) 187/86   Pulse 103   Temp 98.5 °F (36.9 °C) (Temporal)   Resp 16   Wt 103 kg (227 lb)   LMP 06/03/2016 (Approximate)   SpO2 96%   BMI 38.36 kg/m²    General Appearance:  alert, cooperative, no apparent distress, appears stated age and obese   Neurologic/Psychiatric: A&O x 3, gait steady, appropriate affect   HEENT:  Normocephalic, without obvious abnormality, mucous membranes moist   Neck: Supple, symmetrical, trachea midline, no adenopathy;  No thyromegaly, masses, or tenderness   Back:   Symmetric, no curvature, ROM normal, no CVA tenderness   Lungs:   Clear to auscultation bilaterally; respirations regular, even, and unlabored bilaterally   Heart:  Regular rate and rhythm, no murmurs appreciated   Breasts:  deferred   Abdomen:   Soft, non-tender, non-distended, no organomegaly and Exam limited d/t habitus.   Lymph nodes: No cervical, supraclavicular, inguinal or axillary adenopathy noted   Extremities: Normal, atraumatic; no clubbing, cyanosis, or edema    Pelvic: External Genitalia  without lesions or skin changes  Vagina  is pink, moist, without lesions. Scant discharge noted. NuSwab obtained.   Vaginal Cuff  Female Vaginal Cuff: smooth, intact and without visible lesions  Uterus  surgically absent and no palpable masses  Ovaries  surgically absent bilaterallly  Parametria  smooth  Rectovaginal  Female rectovaginal: deferred     ECOG Performance Status: 0 - Asymptomatic    Procedure Notes:  No notes on file    Assessment and Plan:    Tracy was seen today for follow-up.    Diagnoses and all orders for this visit:    Vaginal discharge  -     NuSwab VG+ - Swab, Vagina; Future    Bloating    Endometrial cancer (CMS/HCC)        Patient and I discussed her new symptoms and exam findings. She is tearful in the office today as she is concerned  that these may be evidence of cancer recurrence. She was reassured that there is KELLY upon exam today. NuSwab obtained for further evaluation of new vaginal discharge. No obvious yeast or BV noted today. No bleeding or lesions noted.   Discussed her c/o bloating. I encouraged her to keep a food diary to see if she can correlate her bloating with particular foods. Symptoms are absent in the mornings, but worsen after meals during the day. We will call her next week with lab results and to check in on her symptoms. If bloating persists/worsens, or if new concerning symptoms we can consider CT imaging. If symptoms resolve on their own, continue routine surveillance. She v/u and agrees with plan.       Return to clinic in 3/2019 for scheduled surveillance visit or PRN.       Kat Phipps, IDANIA      Note: Speech recognition transcription software was used to dictate portions of this document.  An attempt at proofreading has been made though minor errors in transcription may still be present.  Please do not hesitate to call our office with any questions.

## 2020-10-21 NOTE — PLAN OF CARE
Patient received this am alert and oriented x 4, lungs diminished, SOB with eating and exertion, up to chair, unable to tolerate due to back pain, assisted back to bed, complaints of difficulty breathing, on room air, saturations remained >95%, oxygen on a

## 2020-10-21 NOTE — PLAN OF CARE
Assuming care of pt from [address] transfer,   A&Ox4, VSS, lungs clear/diminished in bases, room air, pt denies SOB/CP. abd is soft, non-tender, non-distended, abd sounds active. Pt voiding freely, up w/ assist and walker.  [place], Aspiration precautions in patty

## 2020-10-21 NOTE — PROGRESS NOTES
[facility]  Progress Note    Mahogany Morales Patient Status:  Observation    1930 MRN [id number]   [facility] 4NW-A Attending Linda Holt MD   Hosp Day # 0 PCP Sharlene Navarrete MD     Subjective:  Mahogany Morales is a( 10/21/20  0629   RBC 2.29* 2.23* 2.15*   HGB 7.3* 7.3* 7.0*   HCT 23.2* 22.2* 21.6*   .3* 99.6 100.5*   MCH 31.9 32.7 32.6   MCHC 31.5 32.9 32.4   RDW 21.7* 21.6* 21.7*   NEPRELIM 0.52* 0.48* 0.72*   WBC 2.0* 1.7* 2.0*   PLT 76.0* 66.0* 67.0*     Re syndrome     Plan:     CT scan of the chest with a PE protocol  Add albuterol to her nebulizer treatments with attention to her heart rate  Check oxygen saturation with ambulation  We will continue to follow the patient with you.      Terese Felton SR.

## 2020-10-22 NOTE — PROGRESS NOTES
[facility]  Cardiology Progress Note    Gee Fail Patient Status:  Observation    1930 MRN [id number]   [facility] 4NW-A Attending Alessandra Carvajal MD   Hosp Day # 0 PCP Lucretia Macias MD       Subjective:  Eating her b Normal mood and affect  Skin: Warm and dry, no obvious rashes     MEDICATIONS:  • Montelukast Sodium  10 mg Oral Nightly   • ipratropium-albuterol  3 mL Nebulization TID   • dilTIAZem HCl ER Coated Beads  120 mg Oral Daily   • Metoprolol Succinate ER  25 m

## 2020-10-22 NOTE — PROGRESS NOTES
[facility]  Progress Note    Felipe Garcia Patient Status:  Observation    1930 MRN [id number]   [facility] 3NW-A Attending Ramy Mckeon MD   Hosp Day # 0 PCP Dana Lazar MD     Subjective:  Felipe Garcia is a( Recent Labs   Lab 10/19/20  0610   PTP 16.1*   INR 1.25*   PTT 31.4       Cultures: Remain negative    Radiology:  Ct Angiography, Chest (cpt=71275)    Result Date: 10/21/2020  CONCLUSION:  1. No CT evidence for pulmonary embolism.  2. Bilateral lower l reviewed--plans for home with nebulizer    CC     Valery Davila MD  10/22/2020  9:38 AM

## 2020-10-22 NOTE — PROGRESS NOTES
10/22/20 6451   Clinical Encounter Type   Visited With Patient not available   Routine Visit Follow-up   Continue Visiting No  ( took POLST for scanning.)   Patient Spiritual Encounters   Spiritual Assessment Completed No

## 2020-10-22 NOTE — PROGRESS NOTES
1501: Hematology paged, awaiting callback. 1505: Callback received. Notified MD regarding post-transfusion Hgb. Per MD, stable for d/c from hematology standpoint - pt will need weekly CBCs as OP.     1530: Cardiology paged, awaiting callback.    1531: Rashid

## 2020-10-22 NOTE — PROGRESS NOTES
[address] [facility] INTERNIST  Progress Note     Naa Correa Patient Status:  Observation    1930 MRN [id number]   [facility] 4NW-A Attending Sherryle Rhein, MD   Hosp Day # 0 PCP Marquis Neto MD     Chief Complaint: dyspnea     S: 1.25*            [unfilled]    Imaging: Ct Angiography, Chest (cpt=71275)    Result Date: 10/21/2020  CONCLUSION:  1. No CT evidence for pulmonary embolism. 2. Bilateral lower lobe atelectasis and or scar.    Dictated by (CST): Tala Mtz MD on 10/21/2020 at -c/w metoprolol and diltiazem, both held this AM due to low bp will continue to monitor   -cardiology consult placed      Chronic diastolic CHF   -cardiology consult placed   -holding lasix for now due to LYDIA      LYDIA, could be due to contraction diuresi

## 2020-10-22 NOTE — PLAN OF CARE
A&Ox4, VSS, lungs clear/diminished in bases, breathing regular. Pt denies APRIL. Abd is soft, non-tender, bowel sounds are active. Pt voiding. Up w/ assist and walker. Currently resting in bed w/ call light w/ in reach. Tolerating current diet.  Will continue

## 2020-10-22 NOTE — PLAN OF CARE
Pt is alert and oriented x4. Lungs are diminished bilaterally on room air. Denies any difficulty breathing at this time. Tele monitor and  in place. Bowel sounds are active. Tolerating cardiac diet. Denies nausea. IV saline locked.  Occasionally incontin

## 2020-10-23 NOTE — PROGRESS NOTES
NURSING DISCHARGE NOTE    Discharged Home via Wheelchair. Accompanied by Family member  Belongings Returned to patient from safe. VSS, Belongings returned, AVS discharge information reviewed. All questions answered.

## 2020-10-23 NOTE — DISCHARGE SUMMARY
[facility]  Discharge Summary    Tammy Prieto Patient Status:  Observation    1930 MRN [id number]   [facility] 3NW-A Attending Luis Donahue MD   Hosp Day # 0 PCP Reece Cortes MD     Date of Admission: 10/18/2020    D No adenopathy or mass. CARLOS A:  No mass or adenopathy. CARDIAC:  No enlargement, pericardial thickening, or significant calcification. PLEURA:  No mass or effusion. CHEST WALL:  No mass or axillary adenopathy.   LIMITED ABDOMEN:  Limited images of the uppe 9/14/2020, 2:23 PM.  INDICATIONS:  SOB  PATIENT STATED HISTORY: (As transcribed by Technologist)  Patient cannot catch her breath. FINDINGS:   Cardiac silhouette is stable. Pulmonary vasculature is unchanged.   Interstitial abnormalities in the lungs ar Recent Labs   Lab 10/19/20  0610   PTP 16.1*   INR 1.25*            [unfilled]     Imaging: No results found.     Medications:   • Montelukast Sodium  10 mg Oral Nightly   • ipratropium-albuterol  3 mL Nebulization TID   • Metoprolol Succinate ER  25 mg O chart for details  Procedures:  Please refer to chart for details    Disposition: Home or Self Care    Discharge Condition: Stable    Discharge Medications: Current Discharge Medication List    START taking these medications    Ipratropium Bromide HFA 16 M

## 2020-10-23 NOTE — PHYSICAL THERAPY NOTE
IP PT attempted, pt sleeping in bed, easily awakens to voice. Pt politely refusing stating she has not had breakfast yet. Encouraged pt to t/f to chair for meals, pt refusing stating chair is uncomfortable. Will re-attempt as schedule permits .

## 2020-10-23 NOTE — CM/SW NOTE
SW received an order for a nebulizer. Referrals sent to [address] [place] [address], [facility], and [facility]. Awaiting response. Updated RN.

## 2020-10-23 NOTE — PROGRESS NOTES
[address] [facility] INTERNIST  Progress Note     Sabina Huerta Patient Status:  Observation    1930 MRN [id number]   [facility] 4NW-A Attending Wandy Quesada MD   Hosp Day # 0 PCP Estevan Martino MD     Chief Complaint: dyspnea     S: 3 mL Nebulization TID   • Metoprolol Succinate ER  25 mg Oral Daily   • Fluticasone Propionate  2 spray Nasal Nightly   • docusate sodium  100 mg Oral BID       ASSESSMENT / PLAN:     Shortness of breath could be due to multifactorial due to laryngospasms

## 2020-10-23 NOTE — PROGRESS NOTES
Hem/Onc Inpatient Note    Patient Name: Jaime Valle   YOB: 1930   Medical Record Number: [id number]   CSN: [id number]   Attending Hematology/Oncology Physician: Dr. José Gamez    S: patient sleeping. Received 1 unit PRBC yesterday.      Physi Slide Review       Slide reviewed, normal WBC, RBC, and platelet morphology. 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  Yasmani Palumbo

## 2020-10-23 NOTE — CM/SW NOTE
Faxed med list to [facility].  [facility] sparks at hospital and just delivered the nebulizer to the pt's room

## 2020-10-23 NOTE — PLAN OF CARE
Patient alert and oriented x3, forgetful. Vital signs stable. No fever. Hgb stable. Room air with O2 sats 96-98%. . Breathing treatments per RT. No complaints of pain. No nausea and vomiting. Up with assist and walker.  Fall and Aspiration precautions in difficulty  - Respiratory Therapy support as indicated  - Manage/alleviate anxiety  - Monitor for signs/symptoms of CO2 retention  Outcome: Progressing     Problem: HEMATOLOGIC - ADULT  Goal: Maintains hematologic stability  Description: INTERVENTIONS  - A

## 2020-10-26 NOTE — PROGRESS NOTES
[facility] Progress Note    Patient Name: Thanh Silva   YOB: 1930   Medical Record Number: [id number]   CSN: [id number]   Date of visit: 10/29/2020   Provider: MORELIA Singh  Referring Physician: No ref.  provider found fatigue.     Allergies:    Amoxicillin             UNKNOWN  Cephalexin              UNKNOWN  Pcn [Penicillins]           Vital Signs:  Height: --  Weight: 44 kg (97 lb) (10/29 1129)  BSA (Calculated - sq m): --  Pulse: 103 (10/29 1129)  BP: 118/66 (10/29 11 Range    ABO BLOOD TYPE B     RH BLOOD TYPE Positive    ANTIBODY SCREEN    Collection Time: 10/29/20 11:12 AM   Result Value Ref Range    Antibody Screen Negative    CBC W/ DIFFERENTIAL    Collection Time: 10/29/20 11:12 AM   Result Value Ref Range    WBC agency she is receiving care. She will follow up with Dr. John Lemos in 4 wks.       Carina Aldridge, [address] [place] [address] [facility]

## 2020-10-29 NOTE — PROGRESS NOTES
Patient presents with: Follow - Up: APN assessment - lab follow up    Pt is here for post hospital follow up. She was discharged from [facility] on 10/23/20.  She felt stronger when she left the hospital versus today; she appears to have some difficulty breath

## 2020-10-29 NOTE — TELEPHONE ENCOUNTER
Nicole Andrade from [facility] called asking for verbal orders for the patient's labs so they can draw them.

## 2020-11-03 NOTE — TELEPHONE ENCOUNTER
Pt's caregiver called, with the pt, to get the results of the blood work. She states they are waiting on it to determine if she needs a blood transfusion. Please call.

## 2020-11-06 NOTE — PROGRESS NOTES
Pt here for blood transfusion.   Arrives ambulating via walker, accompanied by Family member           Patient reports possible pregnancy since last therapy cycle: No    Modifications in dose or schedule: No     Frequency of blood return and site check thro

## 2020-11-23 NOTE — PROGRESS NOTES
Education Record    Learner:  Patient    Disease / Diagnosis: 1 U RBC - HGB 7    Barriers / Limitations:  None   Comments:    Method:  Discussion   Comments:    General Topics:  Plan of care reviewed   Comments:    Outcome:  Shows understanding   Comments:

## 2020-11-24 NOTE — PROGRESS NOTES
[facility] Progress Note    Problem List:      Patient Active Problem List:     Chronic atrial fibrillation (HCC)     Chronic obstructive pulmonary disease, unspecified COPD type ([facility] [address]     Elevated BP     Encounter for therapeutic drug monitoring     Th (VITAMIN B COMPLEX OR), Take 1 tablet by mouth daily. , Disp: , Rfl:     •  Metoprolol Succinate ER 25 MG Oral Tablet 24 Hr, Take 75 mg by mouth daily.   , Disp: , Rfl: 0    •  Albuterol Sulfate HFA (PROAIR HFA) 108 (90 Base) MCG/ACT Inhalation Aero Soln, In 10/22/2020    BUN 26 (H) 10/22/2020    CREATSERUM 0.90 10/22/2020     (H) 10/22/2020    CA 8.8 10/22/2020    ALKPHO 70 10/22/2020    ALT 12 (L) 10/22/2020    AST 10 (L) 10/22/2020    BILT 0.5 10/22/2020    ALB 2.7 (L) 10/22/2020    TP 5.5 (L) 10/22/ MD

## 2020-11-30 NOTE — TELEPHONE ENCOUNTER
[facility]. I attempted to reach Latrice Reddy. No answer. I told [facility] I will forward this message to Latrice Reddy  He can return the call tomorrow.

## 2020-12-03 NOTE — TELEPHONE ENCOUNTER
Jose G Hernandez (son) wants to have testing at home to avoid her coming on to the center. He will bring her in if the transfusion is necessary. He is trying to avoid her traveling back and forth since she is more and more frail.   She has a visiting nurse  [phone]

## 2020-12-03 NOTE — TELEPHONE ENCOUNTER
Spoke with Sathish, and let him know Dr Adolfo Ortiz is fine with having labs drawn by [facility]. Labs from Monday were drawn at home, an processed at THE [facility] lab. We discuss the process for transfusion, T and C and time frame.    Questions addressed, and bill will talk

## 2020-12-08 NOTE — TELEPHONE ENCOUNTER
Valentin called [place] had labs yesterday with hgb 6.9, he was waiting for call regarding labs and transfusion. Tomorrow would be a good day for transfusion as care giver has car. Please call him asap with plan.

## 2020-12-09 NOTE — PROGRESS NOTES
Pt here for blood transfusion.   Arrives Ambulating with walker, accompanied by Other caregiver           Patient reports possible pregnancy since last therapy cycle: No    Modifications in dose or schedule: No     Frequency of blood return and site check t

## 2020-12-15 PROBLEM — L03.011 CELLULITIS OF FINGER OF RIGHT HAND: Status: ACTIVE | Noted: 2020-01-01

## 2020-12-15 PROBLEM — R58 BLEEDING: Status: ACTIVE | Noted: 2020-01-01

## 2020-12-15 NOTE — ED PROVIDER NOTES
Patient Seen in: [facility] Emergency Department      History   Patient presents with:  Abnormal Labs    Stated Complaint: Abnormal labs ~ had procedure yesterday by Dr Mary Grace Mtz ~ plateltes = 29     HPI    Hayder Condon is a pleasant 58-year-old female wh nontender extremity calcinosis edema except the right hand patient is moving all extremities. Right hand third digit patient is noted to have redness and swelling around the middle to distal phalanx with a very slow ooze noted around the cuticle.   No blee ANTIBODY SCREEN[[id number]]                                  Final result                 Please view results for these tests on the individual orders.    ABORH (BLOOD TYPE)   ANTIBODY SCREEN   PREPARE PLATELETS   RAINBOW DRAW BLUE   RAINBOW DRAW LAVENDER

## 2020-12-15 NOTE — ED INITIAL ASSESSMENT (HPI)
Pt with right hand 3rd digit swelling last week, abx started on Saturday. Pt had finger drained yesterday, wound won't stop bleeding. Pt had PRBC transfusion for low hgb last week. Labs drawn yesterday, plts 29k.

## 2020-12-16 PROBLEM — C95.00 ACUTE LEUKEMIA (HCC): Status: ACTIVE | Noted: 2020-01-01

## 2020-12-16 NOTE — PROGRESS NOTES
[facility]    NON FACE TO FACE VISIT FOR INITIAL ADMIT/ REVIEW OF MEDS/ ORDERS GIVEN/ DISCUSSION WITH ER MD Angela Vilchis Patient Status:  Emergency    1930 MRN [id number]   Location [address] [place] [address] Attending Santos (Not in a hospital admission)          Physical Exam:   Vital Signs:  Blood pressure 133/48, pulse 83, temperature 98.8 °F (37.1 °C), temperature source Temporal, resp. rate 16, height 5' 4\" (1.626 m), weight 100 lb 9.6 oz (45.6 kg), SpO2 99 %.      SEE Assessment/Plan:     Patient Active Problem List:     Chronic atrial fibrillation (HCC)     Chronic obstructive pulmonary disease, unspecified COPD type ([facility] [address]     Elevated BP     Encounter for therapeutic drug monitoring     [facility] saturations     Atrial Fibrillation   -chronic, in NSR   -taken off coumadin in past   -c/w metoprolol 37.5 mg po daily         DISCUSSED WITH ER MD REGARDING ADMIT DISPOSITION/ADMITTING DIAGNOSIS DETERMINED  HOME MED LIST RECONCILED  DVT PROPHYLAXIS GAYLE

## 2020-12-16 NOTE — PHYSICAL THERAPY NOTE
PHYSICAL THERAPY EVALUATION - INPATIENT     Room Number: 5396/0089-Z  Evaluation Date: 12/16/2020  Type of Evaluation: Initial  Physician Order: PT Eval and Treat    Presenting Problem:  Thrombocytopenia  Reason for Therapy: Mobility Dysfunction and Dis the end of gait training but was unable to quantify level of exertion.     OBJECTIVE  Precautions: None  Fall Risk: High fall risk    WEIGHT BEARING RESTRICTION  Weight Bearing Restriction: None     COGNITION  · Overall Cognitive Status:  WFL - within funct gait training activity w/ use of RW and CGA to improve pt circulation and pt functional capacity for ambulation.      Exercise/Education Provided:  Bed mobility  Body mechanics  Functional activity tolerated  Gait training  Transfer training    Patient End rolling at assistance level: supervision     Goal #4    Goal #5    Goal #6    Goal Comments: Goals established on 12/16/2020    Therapist and PT student treating pt in surgical mask, googles and gloves. Patient was wearing a mask during session.   Patient

## 2020-12-16 NOTE — PROGRESS NOTES
[facility] Pharmacy Note: Adjustment for clindamycin (CLEOCIN)    Ezio Zuñiga is a 80year old patient who has been prescribed clindamycin (CLEOCIN) 300 mg every 8 hrs. CrCl is estimated creatinine clearance is 26.7 mL/min (based on SCr of 1.01 mg/dL).  s

## 2020-12-16 NOTE — ED NOTES
Assumed care, report received from Hudson, [facility]. Pt resting in bed, pt private caregiver, Romelia Daly at bedside. Pt reports pain to the R 3rd finger. Gelfoam applied. Pt blood transfusion consent obtained verbally with RN, Karen Finch.  Pt caregiver requesting to stay ov

## 2020-12-16 NOTE — CONSULTS
[facility]  Report of Consultation    Candido Smith Patient Status:  Observation    1930 MRN [id number]   [facility] 3NE-A Attending Sheyla Jay MD   Hosp Day # 0 PCP Ariel Hopkins MD     Reason for Consultation:    T Oral, Nightly PRN  •  metoprolol succinate (Toprol XL) partial tablet 37.5 mg, 37.5 mg, Oral, Daily  •  Albuterol Sulfate  (90 Base) MCG/ACT inhaler 2 puff, 2 puff, Inhalation, Q6H PRN  •  Rachana-Bee/C (ALBEE/C) tab TABS 1 tablet, 1 tablet, Oral, Amilcar regions.       Laboratory Data reviewed at this visit:    Recent Labs   Lab 12/15/20  1628 12/16/20  0303 12/16/20  0526   RBC 2.25*  --  2.52*   HGB 6.9* 7.4* 7.6*   HCT 20.8*  --  22.8*   MCV 92.4  --  90.5   MCH 30.7  --  30.2   MCHC 33.2  --  33.3   RDW finger of right hand     Bleeding      1. Acute leukemia/MDS/AML:    She has increasing peripheral blasts with anemia and thrombocytopenia. She has been getting palliative transfusion support.  I discussed DNR status and she confirms that she does not want

## 2020-12-16 NOTE — PLAN OF CARE
NURSING ADMISSION NOTE      Patient admitted via Cart  Oriented to room. Safety precautions initiated. Bed in low position. Call light in reach. Patient is A&O x4. Anxious. VSS. On tele-Afib (HR spiked up [id number] notified). EKG-Afib RVR.  On

## 2020-12-16 NOTE — PROGRESS NOTES
[facility] Pharmacy Note:  Renal Adjustment for daptomycin (Padmini Bills)    Vivek Neal is a 80year old patient who has been prescribed daptomycin (CUBICIN) 300 mg every 24 hrs.   CrCl is estimated creatinine clearance is 23.3 mL/min (A) (based on SCr of 1.13

## 2020-12-16 NOTE — H&P
[facility] INTERNIST  History & Physical    Vivek Ángel Patient Status:  Inpatient    1930 MRN [id number]   [facility] 3NE-A Attending Rufus Franklin MD   Hosp Day # 1 PCP Ace Velasquez MD     Date:  15 Never    Allergies/Medications: Allergies:   Amoxicillin             UNKNOWN  Cephalexin              UNKNOWN  Pcn [Penicillins]           •  furosemide 20 MG Oral Tab, Take 20 mg by mouth every other day.     •  ipratropium-albuterol 0.5-2.5 (3) MG/3ML I (L) 12/16/2020    PLT 69.0 (L) 12/16/2020    CREATSERUM 1.13 (H) 12/16/2020    BUN 27 (H) 12/16/2020     12/16/2020    K 4.4 12/16/2020     12/16/2020    CO2 24.0 12/16/2020    GLU 92 12/16/2020    CA 8.1 (L) 12/16/2020    ALB 2.6 (L) 12/16/202 on 12/16/2020 at 11:11 AM     Finalized by (CST): Lennie Bosch DO on 12/16/2020 at 11:13 AM       Ekg 12-lead    Result Date: 12/16/2020  Atrial fibrillation with rapid ventricular response with premature ventricular or aberrantly conducted complexes Abno PRN, PT/OT  -monitor saturations      Atrial Fibrillation   -chronic, in NSR   -taken off coumadin in past   -takes metoprolol 37.5 mg po daily, will increase to 50 mg po daily as went into afib rvr overnight     Disposition per   DVT prophyla

## 2020-12-16 NOTE — CONSULTS
[facility]  TEL: [phone]  FAX: [phone]    Criselda Age Patient Status:  Inpatient    1930 MRN [id number]   [facility] 3NE-A Attending Harmony Simeon MD   Hosp Day # 1 PCP Bernard Roberson Medications:    Current Facility-Administered Medications:   •  diphenhydrAMINE (BENADRYL) cap/tab 25 mg, 25 mg, Oral, Nightly PRN  •  [START ON 12/17/2020] Metoprolol Succinate ER (Toprol XL) 24 hr tab 50 mg, 50 mg, Oral, Daily Beta Blocker  •  Zeeshan Baker Moist mucous membranes. Extraocular muscles are intact. Normal Conjunctiva  Neck: Supple  Respiratory: Clear to auscultation bilaterally. No wheezes. No rhonchi. Cardiovascular: S1, S2.  Regular rate and rhythm. No murmurs.   Abdomen: Soft, nontender, n acid  #AML  -WBC 33.7  #thrombocytopenia  #diarrhea  -Cdiff PCR neg    PLAN:  -will start Dapto (avoiding Vanco with thrombocytopenia)  -will also start Cefepime (she tolerated keflex for the last 4 days without itching or hives)  -stop Clinda  -ortho cons

## 2020-12-16 NOTE — SLP NOTE
ADULT SWALLOWING EVALUATION    ASSESSMENT    ASSESSMENT/OVERALL IMPRESSION:  Patient seen for swallowing evaluation due to patient demonstrated difficulty with chewing. Patient admitted due to abnormal labs.   She also has cellulitis of the finger on her r HISTORY  Current Diet Consistency: Regular; Thin liquids  Dysphagia History: as above  Imaging Results:   No recent applicable imaging    SUBJECTIVE       OBJECTIVE   ORAL MOTOR EXAMINATION  Dentition: Upper partials  Symmetry: Within Functional Limits  Str

## 2020-12-16 NOTE — SPIRITUAL CARE NOTE
Please refer POLST consutation to hospitalist/physician as appropriate:    As requested by the nurse,  spoke with patient's son, Augustus Walker. He is looking for a copy of the signed POLST form, DNAR/Selective Treatment.   could not find

## 2020-12-16 NOTE — OCCUPATIONAL THERAPY NOTE
OCCUPATIONAL THERAPY EVALUATION - INPATIENT     Room Number: 9536/3711-J  Evaluation Date: 12/16/2020  Type of Evaluation: Initial  Presenting Problem: thrombocytoepnia    Physician Order: IP Consult to Occupational Therapy  Reason for Therapy: ADL/IADL Dy labs:  Hb 7.6, platelet count 33.2       Problem List  Principal Problem:     Thrombocytopenia ([address]  Active Problems:    Myelodysplastic syndrome, high grade (HCC)    Cellulitis of finger of right hand    Bleeding    Acute leukemia [facility])      Past Medical AND STRENGTH ASSESSMENT  Upper extremity ROM is within functional limits     Upper extremity strength is grossly 4+/5 bilaterally    COORDINATION  Gross Motor    WFL    Fine Motor    WFL      ADDITIONAL TESTS                                    NEUROLOGICAL admitted on 12/15/2020 for Presenting Problem: thrombocytoepnia. Complete medical history and occupational profile noted above. Functional outcome measures completed include AM-PAC.  In this OT evaluation patient presents with the following performance defi supervision    Functional Transfer Goals  Patient will transfer to toilet:  with supervision    UE Exercise Program Goal  Patient will be independent with bilateral AROM HEP (home exercise program).     Additional Goals:  Pt will tolerate standing for 10 mi

## 2020-12-16 NOTE — CM/SW NOTE
12/16/20 1600   CM/SW Referral Data   Referral Source Physician   Reason for Referral Discharge planning   Informant Patient   Patient Info   Patient's Mental Status Alert;Oriented   Patient Communication Issues Hearing impairment   Number of Levels in

## 2020-12-16 NOTE — HOME CARE LIAISON
Ptnt current with [facility] for sn/pt  Will need a DAVID order on or before dc    Thanks  Benjamin Bird

## 2020-12-17 PROBLEM — Z71.89 GOALS OF CARE, COUNSELING/DISCUSSION: Status: ACTIVE | Noted: 2020-01-01

## 2020-12-17 PROBLEM — Z51.5 PALLIATIVE CARE ENCOUNTER: Status: ACTIVE | Noted: 2020-01-01

## 2020-12-17 NOTE — PROCEDURES
Procedure was performed at bedside. A digital block of the third finger right hand was performed using 5 cc of 1% Xylocaine. This was done under sterile technique. After adequate anesthesia was obtained an I&D was performed of the paronychia.   A #11 rei

## 2020-12-17 NOTE — TELEPHONE ENCOUNTER
Patients son is calling wanting to speak to Dr. Virginia Cross or whoever schedules surgery. Bettina Medellin states his mother is miserable, has yet to receive a room and they need to get the ball rolling on what to do next.

## 2020-12-17 NOTE — CONSULTS
[address]  [id number]  Hospital Day #2  Date of Consult: 12/17/20  Patient seen at: [facility]    Reason for Consultation:      Consult requested by Dr. Reece Cortes for evaluat requirements in past 24 hours:       •  diphenhydrAMINE, 25 mg, Nightly PRN    •  LORazepam, 0.25 mg, Daily PRN    •  zolpidem, 5 mg, Nightly PRN x1    •  Albuterol Sulfate HFA, 2 puff, Q6H PRN    •  acetaminophen, 650 mg, Q6H PRN    •  acetaminophen, 650 Oral, BID    •  acetaminophen (TYLENOL) tab 650 mg, 650 mg, Oral, Q6H PRN    •  acetaminophen (TYLENOL) tab 650 mg, 650 mg, Oral, Q4H PRN    Or    •  HYDROcodone-acetaminophen (NORCO) 5-325 MG per tab 1 tablet, 1 tablet, Oral, Q6H PRN    •  docusate sodium carpal/metacarpal joint space. 4. No acute fracture.     Dictated by (CST): Magda Carrillo MD on 12/15/2020 at 5:22 PM     Finalized by (CST): Magda Carrillo MD on 12/15/2020 at 5:24 PM       Us Hand Right Limited (PAD=[id number])    Result Date: 12/16/2 work Mainly Assist Normal or Reduced Full or confused   30 Bedbound Extensive Disease  Can't do any work Max Assist  Total Care Reduced Drowsy/confused   20 Bedbound Extensive Disease  Can't do any work Max Assist  Total Care Minimal Drowsy/confused   10 B care)  3) Support/visits from multi-disciplinary team (Nurse Practitioner, Social Work, , PCT, others)     Palliative Care Services:  1) Usually visit once per 3-4 weeks  2) Ongoing GOC discussions with disease progression and advanced care plannin witnessed scanned to EMR. Discussed with patient and daughter. Requested that patient sign new document but she was hesitant without discussing with her daughter first. Ab Campos to visit later and discuss.  Old document and new blank POLST left in room fo STATUS: CHANGED TO DNAR/SELECTIVE  3. POLST: See above discussion. Daughter to discuss signing POLST with patient later today. 4. Healthcare POA:   5.  Disposition: Home with [address] for ongoing [place] [address] support and monitoring of

## 2020-12-17 NOTE — CONSULTS
[address]    PATIENT'S NAME: Gaby Centeno   ATTENDING PHYSICIAN: Roby Meade MD   CONSULTING PHYSICIAN: Lalo Bella M.D.    PATIENT ACCOUNT#:   [de-identified]    LOCATION:  [address] [place] [address]  MEDICAL RECORD #:   [id number]       DATE OF DARLING insufficiency. SOCIAL HISTORY:  Patient lives in her own apartment now with full-time caregivers. Family is engaged in her care. No alcohol. No excess tobacco.  Uses a walker but very sedentary.     FAMILY HISTORY:  Father  in his 46s from heart d day.  Currently, she is on 50 a day. We will monitor on this dose, especially with her periods of low blood pressure and titrate back up to 75 if appropriate.      Dictated By Romayne Rabon, M.D.  d: 12/16/2020 17:41:42  t: 12/16/2020 18:31:35  [facility] [id number]

## 2020-12-17 NOTE — PLAN OF CARE
Patient received this evening alert and oriented x 4, lungs diminished on room air, periods of unsustained tachycardia with activity, abdomen soft, bowel sounds present, passing flatus, BM this evening, patient has some incontinence at times, ambulatory to

## 2020-12-17 NOTE — PROGRESS NOTES
[facility] received palliative referral for community PC services. Will follow up.         Alline Foil  [facility]   [phone]

## 2020-12-17 NOTE — CONSULTS
[facility]    Report of Consultation    Criselda Age Patient Status:  Inpatient    1930 MRN [id number]   [facility] 3NE-A Attending Harmony Simeon MD   Hosp Day # 1 PCP Jorge A Barber MD     Date of Admission:  12/15/2 MBP/add-vantage, 1 g, Intravenous, Q12H    •  Albuterol Sulfate  (90 Base) MCG/ACT inhaler 2 puff, 2 puff, Inhalation, Q6H PRN    •  Rachana-Bee/C (ALBEE/C) tab TABS 1 tablet, 1 tablet, Oral, Daily    •  Fluticasone Propionate (FLONASE) 50 MCG/ACT nasa weight 98 lb 6.4 oz (44.6 kg), SpO2 95 %. Exam: right hand with third finger moderate to marked swelling. Ecchymotic and erythematous. Moderate to marked sensitivity.      Results:     Laboratory Data:  Lab Results   Component Value Date    WBC 33.7 (H) Diffuse soft tissue swelling and hyperemia to the 3rd digit consistent with cellulitis. 2. No soft tissue abscess is identified. 3. 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   Pl

## 2020-12-17 NOTE — PROGRESS NOTES
R hand 3rd finger red and edematous. US of R hand done, Ortho called on consult. Declines pain meds.  Incontinent large diarrhea stool x1-negative for Cdiff,

## 2020-12-17 NOTE — PROGRESS NOTES
Heme/Onc Progress Note - [facility]      Chief Complaint:    Follow up for evaluation and management of acute leukemia and anemia/thrombocytopenia with right 3rd finger infection.     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 Please correlate clinically. There is associated synovial proliferation/synovitis involving the PIP joint.  Of note, this is similar in appearance to the adjacent digits.    =====  C

## 2020-12-17 NOTE — PROGRESS NOTES
[facility]  Cardiology/[facility]    Progress Note    Lc Bloom  80year old  [id number]  MD Yo Dumont MD    ASSESSMENT:    1. Right middle finger cellulitis. S/P I+D  12/17/20   2. Atrial fibrillation, chronic.   3 98 lb 6.4 oz (44.6 kg), SpO2 97 %. General: Alert and oriented in no apparent distress. HEENT: No focal deficits. Neck: No JVD, carotids no bruits. Cardiac: Regular rate and rhythm, S1, S2 normal, no murmur, rub or gallop.   Lungs: Clear without wheezes Oral, Q6H PRN  •  acetaminophen (TYLENOL) tab 650 mg, 650 mg, Oral, Q4H PRN **OR** HYDROcodone-acetaminophen (NORCO) 5-325 MG per tab 1 tablet, 1 tablet, Oral, Q6H PRN  •  docusate sodium (COLACE) cap 100 mg, 100 mg, Oral, BID  •  PEG 3350 (MIRALAX) powder

## 2020-12-17 NOTE — CM/SW NOTE
Rounds:  MSW, Yusef Tejada and RN discussed patient's post d/c needs in care rounds. Current with [facility] Caregivers at home.  MSW sent referral to [facility] for home PC after dc-referral pending      Barrier to dc: Pt needs IND of finger, follow for ID  PC

## 2020-12-17 NOTE — PROGRESS NOTES
Patient without new complaints. Right third finger remains painful. Exam shows pustule on dorsal aspect of finger just proximal to nail bed. Moderate tenderness and swelling. Impression is that of paronychium right third finger.     Plan is to perform

## 2020-12-17 NOTE — PHYSICAL THERAPY NOTE
Attempted to see pt for PT session today and pt eating lunch. Will continue to follow and attempt as schedule allows. Thank you.

## 2020-12-17 NOTE — TELEPHONE ENCOUNTER
I spoke with Jean Paul Drew. Dr. Jelani Hudson saw his mom today at the hospital and all is good. He thanked me for calling.

## 2020-12-18 NOTE — RESPIRATORY THERAPY NOTE
Patient called for PRN neb treatment, states she gets short of breath occasionally but resolves with deep breaths. Patient currently has food tray and does not want neb would rather eat. Breath sounds are clear but diminished, sats 97% on room air.  No kevin

## 2020-12-18 NOTE — PROGRESS NOTES
Palliative care; Met with daughter and patient again to discuss POLST. Document reviewed in detail and completed and signed indicating wishes for DNAR/Selective and no indication of wishes for medical nutrition at this time.  Original and copy provided to

## 2020-12-18 NOTE — PROGRESS NOTES
[facility]    Progress Note    Bre Shepard  80year old  [id number]  MD Lilia Eller MD    ASSESSMENT:    1. Right middle finger cellulitis. S/P I+D  12/17/20   2. Atrial fibrillation, chronic.   3 pressure 101/52, pulse 100, temperature 97.6 °F (36.4 °C), temperature source Axillary, resp. rate 18, height 5' 1\" (1.549 m), weight 98 lb 6.4 oz (44.6 kg), SpO2 96 %. General: Alert and oriented in no apparent distress. HEENT: No focal deficits.   Neck MCG/ACT nasal spray 2 spray, 2 spray, Nasal, Nightly  •  Montelukast Sodium (SINGULAIR) tab 10 mg, 10 mg, Oral, Nightly  •  Acidophilus/Pectin (PROBIOTIC) CAPS 1 capsule, 1 capsule, Oral, BID  •  acetaminophen (TYLENOL) tab 650 mg, 650 mg, Oral, Q6H PRN  •

## 2020-12-18 NOTE — PROGRESS NOTES
12/18/20 0751   Clinical Encounter Type   Visited With Patient   Routine Visit   (Responded to prayer request/consult)   Moravian Encounters   Moravian Needs Prayer  (Prayed and promoted inner primitivo and inspiration.)   Patient Spiritual Encounters   Spi

## 2020-12-18 NOTE — CM/SW NOTE
sw consult order noted for informational meeting with [facility] hospice. Referral made to [facility] hospice via [address]. sw to continue to follow    Plan: [facility] referral pending.  Pt current with residential c; PC referral pending with [facility]

## 2020-12-18 NOTE — PROGRESS NOTES
[address] Follow Up    Gee Box  [id number]  Patient seen at: [facility] Day #3    Subjective:      Patient asking that I talk to her daughter and that she doesn't want to talk.    Mild SOB and pain mg, Oral, Nightly  •  Acidophilus/Pectin (PROBIOTIC) CAPS 1 capsule, 1 capsule, Oral, BID  •  acetaminophen (TYLENOL) tab 650 mg, 650 mg, Oral, Q6H PRN  •  acetaminophen (TYLENOL) tab 650 mg, 650 mg, Oral, Q4H PRN **OR** HYDROcodone-acetaminophen (NORCO) 5 consistent with cellulitis. No soft tissue abscess is identified. There is severe arthropathy of the 3rd PIP and DIP joints, which radiographically have the   appearance of inflammatory osteoarthritis. Please correlate clinically.   There is associated s Reduced Full or confused   30 Bedbound Extensive Disease  Can't do any work Max Assist  Total Care Reduced Drowsy/confused   20 Bedbound Extensive Disease  Can't do any work Max Assist  Total Care Minimal Drowsy/confused   10 Bedbound/coma Extensive Diseas spiritual support []. Spiritual service following [x]. Disposition: home palliative care with [facility] with informational hospice visits ordered for both [facility] and [facility].       Problem List   Patient Active Problem List:     Chronic atrial fi

## 2020-12-18 NOTE — PROGRESS NOTES
[address] [facility] INTERNIST  Progress Note     Shadjairon Armstrong Patient Status:  Inpatient    1930 MRN [id number]   [facility] 3NE-A Attending Tina Haji MD   Hosp Day # 2 PCP David Martines MD     Chief Complaint: cellulitis and an Severe arthropathy of the DIP and PIP joints, which radiographically have the appearance of inflammatory osteoarthritis. Please correlate clinically.    Dictated by (CST): Moose Mckeon DO on 12/16/2020 at 11:11 AM     Finalized by (CST): Moose Mckeon DO -ortho on board, s/p I&D with 10 cc of pus      Chronic Diastolic CHF   -takes 20 mg lasix every other day, appears euvolemic, Cr better than it was off lasix   -will diuresis PRN     Chronic Dyspnea   -multifactorial, in setting of CHF, acute leukemia

## 2020-12-18 NOTE — PLAN OF CARE
A/O x 4. Anxiety  VSS after I/D of R 3rd finger- dressing intact  Tylenol given prophylactically for pain  Room air, nebs PRN  Tele Afib- HR some times in 120s or 130s asymptomatically due to anxiety  Sleeping pill ambien with good relief.   Set up and orde

## 2020-12-18 NOTE — PROGRESS NOTES
[address] [facility] INTERNIST  Progress Note     Donnmisael Haines Patient Status:  Inpatient    1930 MRN [id number]   [facility] 3NE-A Attending Rom Britton MD   Hosp Day # 3 PCP Keysha Farfan MD     Chief Complaint: cellulitis and an • Metoprolol Succinate ER  25 mg Oral QPM   • zolpidem  5 mg Oral Nightly   • metoprolol succinate  50 mg Oral Daily Beta Blocker   • DAPTOmycin  6 mg/kg Intravenous Q48H   • cefepime  1 g Intravenous Q12H   • Rachana-Bee/C  1 tablet Oral Daily   • Fluticas PRN, PT/OT  -monitor saturations      Atrial Fibrillation   -chronic, in NSR   -taken off coumadin in past   -metoprolol 50 mg in daily, 25 mg po in evening started by cards      Disposition per   DVT prophylaxis– scd       See tests ordered,

## 2020-12-18 NOTE — PROGRESS NOTES
R third finger dressing removed per ID. Drainage from finger cultured. Small amt bleeding noted after dressing changed. Medicated w/toradol x1 w/good relief.

## 2020-12-18 NOTE — PROGRESS NOTES
Heme/Onc Progress Note - [facility]      Chief Complaint:    Follow up for evaluation and management of acute leukemia and anemia/thrombocytopenia with right 3rd finger infection. Interim History:      She had I&D of the right third finger yesterday.  She has abscess is identified. There is severe arthropathy of the 3rd PIP and DIP joints, which radiographically have the appearance of inflammatory osteoarthritis. Please correlate clinically.  There is associated synovial proliferation/synovitis involving the PIP

## 2020-12-18 NOTE — PROGRESS NOTES
[facility]    Progress Note    Jayde Nice Patient Status:  Inpatient    1930 MRN [id number]   [facility] 3NE-A Attending Reynaldo Anand MD   Hosp Day # 3 PCP Thea Fajardo MD        Subjective:   Jayde Nice i

## 2020-12-18 NOTE — PROGRESS NOTES
[address] [facility]  TEL: [phone]  FAX: [phone]    Kellyne Ruben Patient Status:  Inpatient    1930 MRN [id number]   [facility] 3NE-A Attending Michael Schmidt MD   Hosp Day # 3 PCP Bernard Roberson 54* 55   AST 9* 5* 6*   ALT 17 15 15   BILT 0.3 0.3 0.3   TP 5.5* 5.2* 5.3*           Microbiology    Reviewed in EMR,   No results found for this visit on 12/15/20.       Radiology: Xr Finger(s) (min 2 Views), Right 3rd (cpt=73140)    Result Date: 12/15/20 leukocytosis- due to above    Case and plan d/w pt and RN  Will follow    [address]    D/w pt's daughter and with Dr Patrice Romo, familyreally wants to take pt home. Daughter states that she can bring her back if finger worsens.  Need to arrange

## 2020-12-18 NOTE — PROGRESS NOTES
On continued iv abt for cellulitis on rt 3rd finger,Denies any pain,I/D was done at bedside by ,tolerated the procedure,Up to bathroom w/ walker w/ assist, Sched meds tolerated well,Verbalized needs. Dressing to be changed by Tam Mooney tomorro

## 2020-12-18 NOTE — PHYSICAL THERAPY NOTE
PHYSICAL THERAPY TREATMENT NOTE - INPATIENT    Room Number: 3813/9408-K     Session: 1   Number of Visits to Meet Established Goals: 3    Presenting Problem:  Thrombocytopenia    History related to current admission: Pt is a [address] y/o woman presenting w/ thro SHORT FORM - BASIC MOBILITY  How much difficulty does the patient currently have. ..  -   Turning over in bed (including adjusting bedclothes, sheets and blankets)?: None   -   Sitting down on and standing up from a chair with arms (e.g., wheelchair, bedsid concerns addressed; Alarm set    ASSESSMENT   Pt continues to present with impaired strength  , decreased endurance and impaired balance below PLOF  . Pt will continue to benefit from ongoing IP PT to maximize functional independence.   The AM-PAC '6-Clicks

## 2020-12-19 NOTE — PROGRESS NOTES
NURSING DISCHARGE NOTE    PIV removed by RN. Discharge instructions / medications/ follow-up appointments reviewed with patient and patient's daughter. Paper scripts for Clindamycin & Sulfamethoxazole given to patient/patient's daughter with discharge

## 2020-12-19 NOTE — DISCHARGE SUMMARY
[facility]  Discharge Summary    Jayde Nice Patient Status:  Inpatient    1930 MRN [id number]   [facility] 3NE-A Attending Reynaldo Anand MD   Hosp Day # 4 PCP Thea Fajardo MD     Date of Admission: 12/15/2020    [place] the heads of multiple proximal and middle phalanges most prominent involving the 3rd and 4th digits. This either represents erosive osteoarthritis or gout. There is soft tissue swelling most severe involving the 3rd digit. There is no acute fracture.  3. Please correlate clinically.    Dictated by (CST): Maren Cardona,  on 12/16/2020 at 11:11 AM     Finalized by (CST): Maren Cardona DO on 12/16/2020 at 11:13 AM       Reason for Admission: see below    Physical Exam: see below    Hospital Course: see below with metoprolol succ 50mg and digoxin 125 mcg   Consultations:   please refer to chart for details  Procedures:  Please refer to chart for details    Disposition: Home or Self Care    Discharge Condition: Stable    Discharge Medications: Current Discharge

## 2020-12-19 NOTE — PROGRESS NOTES
Heme/Onc Progress Note       Chief Complaint:    Follow up for evaluation and management of acute leukemia and anemia/thrombocytopenia with right 3rd finger infection.     Interim History:      She felt dyspneic this AM, but is feeling better with a breathi osteoarthritis. Please correlate clinically. There is associated synovial proliferation/synovitis involving the PIP joint. Of note, this is similar in appearance to the adjacent digits.    =====  CONCLUSION:    1.  Diffuse soft tissue swelling and hyperemia

## 2020-12-19 NOTE — PLAN OF CARE
Assumed care at 7:30 am. Cardiac monitoring. PIV SL/ABX per orders. PRN nebs administered. Daughter at bedside. O2 walk completed, patient tolerated well. Dr. Evan Adorno notified of elev WBC at bedside.   Dr. Abdulaziz Traylor. Elkin contreras to d/c

## 2020-12-19 NOTE — PROGRESS NOTES
[address] [facility] INTERNIST  Progress Note     Stefanie Paulino Patient Status:  Inpatient    1930 MRN [id number]   [facility] 3NE-A Attending Ariane Page MD   Hosp Day # 4 PCP Niesha Villalobos MD     Chief Complaint: cellulitis and an Succinate ER  25 mg Oral QPM   • zolpidem  5 mg Oral Nightly   • metoprolol succinate  50 mg Oral Daily Beta Blocker   • DAPTOmycin  6 mg/kg Intravenous Q48H   • cefepime  1 g Intravenous Q12H   • Rachana-Bee/C  1 tablet Oral Daily   • Fluticasone Propionate acute leukemia, vocal cord dysfunction, sacropenia   -duonebs PRN, PT/OT  -monitor saturations      Atrial Fibrillation   -chronic, in NSR   -taken off coumadin in past   -dc with metoprolol succ 50mg and digoxin 125 mcg      Disposition per

## 2020-12-19 NOTE — PROGRESS NOTES
[facility] Cardiology  Progress Note    Bri Certain Patient Status:  Inpatient    1930 MRN [id number]   [facility] 3NE-A Attending Awa Sandy MD   Hosp Day # 4 PCP Agusto Mcelroy MD       Subjective:      Ms. Francie Manzano Recent Labs   Lab 12/15/20  1628 12/16/20  0526 12/17/20  0650 12/18/20  0800   ALT 13 17 15 15   AST 6* 9* 5* 6*   ALB 2.6* 2.6* 2.4* 2.4*   LDH  --  338*  --   --        Recent Labs   Lab 12/18/20  1229   PGLU 93       Tele: chronic atrial fibrilla EF  Has been compensated    Hypertension  Well controlled on current regimen  BP goal < 130/80    Increasing leukocytosis, anemia  In the setting of acute leukemia / MDS / AML  Heme / onc and ID following        Discussed plan of care with Dr. Kary Titus.

## 2020-12-19 NOTE — PROGRESS NOTES
Pt A&Ox3-4 Room Air  [place] bilateral hearing Aids  A-fib on Tele  Resting in bed   Meds Given per Mar  Denies pain at this time  Third right finger dressing CDI  Voids 1x Assist walker   Set up for meal  Plan d/c home  PT rec Home with Home PT  Safety precau

## 2020-12-21 NOTE — TELEPHONE ENCOUNTER
Patient's care taker Radha Patel was advised to schedule a follow up appointment with Dr. Marquis Beltrán sometime this week. Unsure where to put patient on his schedule.

## 2020-12-22 NOTE — PROGRESS NOTES
Patient is a 25-year-old white female here for follow-up. I did perform an I&D of her right third finger in the hospital about 5 days ago. Patient is here for follow-up.   Patient did have cellulitis of this right third finger with also with some abscess

## 2020-12-23 NOTE — TELEPHONE ENCOUNTER
I had to leave a message for Alexandr Snow telling her that Dr. Leidy Aviles would like her to continue soaking the finger until he sees her for follow up.

## 2020-12-23 NOTE — TELEPHONE ENCOUNTER
Rafael Otto the caregiver is calling wanting to know if they can stop soaking her finger in the water solution because it is not healing like it should. Can they just wrap it?

## 2020-12-28 NOTE — TELEPHONE ENCOUNTER
Isauro Morton caregiver called to let Dr Marcial Plasencia know that [facility] had labs drawn last Tuesday. The results were faxed to his office about 10 minutes ago.  Britt Potter is requesting a call back this afternoon to schedule Belgica a blood transfusion if she nee

## 2020-12-29 NOTE — PROGRESS NOTES
Patient is a 24-year-old white female here for follow-up. Patient had the I&D of her right third finger for an abscess and cellulitis of the finger.   Patient's has been doing Epsom salt soaks and also her caregivers been putting antibiotic ointment on the

## 2020-12-29 NOTE — TELEPHONE ENCOUNTER
Patient Antony Lewis Slipayden at [phone] he   need to know whether the patient is getting PRBC or Platelets, they have an appointment on 12/30/20 at 1000 AM.

## 2020-12-30 NOTE — PROGRESS NOTES
Education Record    Learner:  Patient and Other:  caregiver    Disease / Diagnosis: anemia    Barriers / Limitations:  None   Comments:    Method:  Discussion, Printed material and Reinforcement   Comments:    General Topics:  Medication, Procedure, Side e

## 2021-01-04 ENCOUNTER — TELEPHONE (OUTPATIENT)
Dept: ORTHOPEDICS CLINIC | Facility: CLINIC | Age: 86
End: 2021-01-04

## 2021-01-04 ENCOUNTER — APPOINTMENT (OUTPATIENT)
Dept: HEMATOLOGY/ONCOLOGY | Facility: HOSPITAL | Age: 86
End: 2021-01-04
Attending: INTERNAL MEDICINE
Payer: COMMERCIAL

## 2021-01-11 ENCOUNTER — APPOINTMENT (OUTPATIENT)
Dept: HEMATOLOGY/ONCOLOGY | Facility: HOSPITAL | Age: 86
End: 2021-01-11
Attending: INTERNAL MEDICINE
Payer: COMMERCIAL

## 2021-01-18 ENCOUNTER — APPOINTMENT (OUTPATIENT)
Dept: HEMATOLOGY/ONCOLOGY | Facility: HOSPITAL | Age: 86
End: 2021-01-18
Attending: CLINICAL NURSE SPECIALIST
Payer: COMMERCIAL

## (undated) NOTE — MR AVS SNAPSHOT
[address] [address]  [phone]                    After Visit Summary   10/9/2020    Jaime Valle    MRN: [id number]           Visit Information     Date & Time  10/9/2020  1:00 P 7/13/2021 1:00 PM Jennifer Rosario, [address], [address]      To-Do List     To-Do List     Future Appointments Provider Department Dept Phone    10/16/2020 2:00 PM Sancho Hannah, [facility] Cardiology [facility], [address]